# Patient Record
Sex: MALE | Race: WHITE | NOT HISPANIC OR LATINO | Employment: OTHER | RURAL
[De-identification: names, ages, dates, MRNs, and addresses within clinical notes are randomized per-mention and may not be internally consistent; named-entity substitution may affect disease eponyms.]

---

## 2022-07-14 ENCOUNTER — TELEPHONE (OUTPATIENT)
Dept: FAMILY MEDICINE CLINIC | Facility: CLINIC | Age: 77
End: 2022-07-14

## 2022-07-14 RX ORDER — DIAZEPAM 5 MG/1
TABLET ORAL
COMMUNITY
Start: 2022-06-14 | End: 2022-07-15 | Stop reason: SDUPTHER

## 2022-07-14 RX ORDER — DIAZEPAM 5 MG/1
5 TABLET ORAL EVERY 12 HOURS PRN
Qty: 60 TABLET | Refills: 0 | Status: CANCELLED | OUTPATIENT
Start: 2022-07-14

## 2022-07-14 NOTE — TELEPHONE ENCOUNTER
Caller: Omar Waite    Relationship: Self    Best call back number: [phone]     Requested Prescriptions:   Requested Prescriptions      No prescriptions requested or ordered in this encounter      DIAZEPAM 5 MG    Pharmacy where request should be sent:      [address] [phone] [facility] [phone] FX     Additional details provided by patient: PATIENT HAS NOT SEEN DOCTOR OANH SINCE JANUARY    Does the patient have less than a 3 day supply:  [] Yes  [x] No    Sofia Morrow Rep   07/14/22 11:28 EDT

## 2022-07-14 NOTE — TELEPHONE ENCOUNTER
I need Dmitri, AND PT WILL HAVE TO HAVE APPT WITH SOMEONE ASAP. Ok TO SEND IN 30 WITH NO REFILLS UNTIL HE GETS SEEN.

## 2022-07-15 DIAGNOSIS — F41.9 ANXIETY: Primary | ICD-10-CM

## 2022-07-15 RX ORDER — DIAZEPAM 5 MG/1
5 TABLET ORAL EVERY 12 HOURS PRN
Qty: 30 TABLET | Refills: 0 | Status: SHIPPED | OUTPATIENT
Start: 2022-07-15 | End: 2022-07-21 | Stop reason: SDUPTHER

## 2022-07-15 NOTE — TELEPHONE ENCOUNTER
PT CONTACTED, APPT SCHEDULED WITH MERARY GODOY 7/21/22 AS DR LUGO DID NOT HAVE ANY OPENINGS UNTIL 8/15.

## 2022-07-21 ENCOUNTER — OFFICE VISIT (OUTPATIENT)
Dept: FAMILY MEDICINE CLINIC | Facility: CLINIC | Age: 77
End: 2022-07-21

## 2022-07-21 VITALS
HEART RATE: 79 BPM | OXYGEN SATURATION: 97 % | DIASTOLIC BLOOD PRESSURE: 70 MMHG | SYSTOLIC BLOOD PRESSURE: 120 MMHG | WEIGHT: 194.5 LBS

## 2022-07-21 DIAGNOSIS — F41.9 ANXIETY: Primary | ICD-10-CM

## 2022-07-21 DIAGNOSIS — F41.9 ANXIETY: ICD-10-CM

## 2022-07-21 DIAGNOSIS — Z79.899 ENCOUNTER FOR LONG-TERM (CURRENT) USE OF OTHER MEDICATIONS: ICD-10-CM

## 2022-07-21 LAB
POC AMPHETAMINES: NEGATIVE
POC BARBITURATES: NEGATIVE
POC BENZODIAZEPHINES: POSITIVE
POC COCAINE: NEGATIVE
POC METHADONE: NEGATIVE
POC METHAMPHETAMINE SCREEN URINE: NEGATIVE
POC OPIATES: NEGATIVE
POC OXYCODONE: NEGATIVE
POC PHENCYCLIDINE: NEGATIVE
POC PROPOXYPHENE: NEGATIVE
POC THC: NEGATIVE
POC TRICYCLIC ANTIDEPRESSANTS: NEGATIVE

## 2022-07-21 PROCEDURE — 99213 OFFICE O/P EST LOW 20 MIN: CPT | Performed by: NURSE PRACTITIONER

## 2022-07-21 PROCEDURE — 36415 COLL VENOUS BLD VENIPUNCTURE: CPT | Performed by: NURSE PRACTITIONER

## 2022-07-21 PROCEDURE — 80305 DRUG TEST PRSMV DIR OPT OBS: CPT | Performed by: NURSE PRACTITIONER

## 2022-07-21 RX ORDER — TRAZODONE HYDROCHLORIDE 50 MG/1
TABLET ORAL
COMMUNITY
Start: 2022-04-26 | End: 2023-01-05

## 2022-07-21 RX ORDER — ATORVASTATIN CALCIUM 80 MG/1
80 TABLET, FILM COATED ORAL
COMMUNITY
Start: 2022-04-26

## 2022-07-21 RX ORDER — OMEPRAZOLE 40 MG/1
40 CAPSULE, DELAYED RELEASE ORAL DAILY
COMMUNITY
Start: 2022-04-26 | End: 2023-04-03

## 2022-07-21 RX ORDER — FENOFIBRATE 160 MG/1
160 TABLET ORAL DAILY
COMMUNITY
Start: 2022-04-26

## 2022-07-21 RX ORDER — DIAZEPAM 5 MG/1
5 TABLET ORAL EVERY 12 HOURS PRN
Qty: 30 TABLET | Refills: 0 | Status: SHIPPED | OUTPATIENT
Start: 2022-07-21 | End: 2022-09-27 | Stop reason: SDUPTHER

## 2022-07-21 NOTE — PROGRESS NOTES
Chief Complaint  Med Refill    Subjective          Omar Waite presents to [facility]  History of Present Illness     Patient just needs refill of his Valium.  States he has been on it for 50 years.  Takes it for anxiety.  States his son just passed away from cancer earlier in the year.  His wife has chronic health issues and he is constantly on the go with her as she has to have dialysis 3 days a week.  States he takes 1 in the morning and at night and it helps him sleep also.  States he does well with the medications with no issues or side effects from them.    Objective   Vital Signs:   /70   Pulse 79   Wt 88.2 kg (194 lb 8 oz)   SpO2 97%     There is no height or weight on file to calculate BMI.    Review of Systems   Constitutional: Negative for fatigue.   Eyes: Negative for visual disturbance.   Cardiovascular: Negative for chest pain and palpitations.   Neurological: Negative for dizziness, weakness, headache and confusion.   Psychiatric/Behavioral: Negative.  Negative for agitation.       Past History:  Medical History: has a past medical history of CAD (coronary artery disease), Chronic prostatitis, Coronary arteriosclerosis in native artery, GERD with esophagitis, Heart attack (HCC), High risk medication use, Hypertension, Insomnia due to anxiety and fear, Malaise and fatigue, Megaloblastic anemia, Mixed hyperlipidemia, Mood insomnia (HCC), Phobic disorder, Skin cancer, and Vitamin D deficiency.   Surgical History: has a past surgical history that includes Cholecystectomy.   Family History: family history includes Alzheimer's disease in his father; Coronary artery disease in his mother.   Social History: reports that he quit smoking about 20 years ago. His smoking use included cigarettes. He has never used smokeless tobacco.    PHQ-2 Depression Screening  Little interest or pleasure in doing things? 0-->not at all   Feeling down, depressed, or hopeless?  1-->several days   PHQ-2 Total Score 1        PHQ-9 Depression Screening  Little interest or pleasure in doing things? 0-->not at all   Feeling down, depressed, or hopeless? 1-->several days   Trouble falling or staying asleep, or sleeping too much?     Feeling tired or having little energy?     Poor appetite or overeating?     Feeling bad about yourself - or that you are a failure or have let yourself or your family down?     Trouble concentrating on things, such as reading the newspaper or watching television?     Moving or speaking so slowly that other people could have noticed? Or the opposite - being so fidgety or restless that you have been moving around a lot more than usual?     Thoughts that you would be better off dead, or of hurting yourself in some way?     PHQ-9 Total Score 1   If you checked off any problems, how difficult have these problems made it for you to do your work, take care of things at home, or get along with other people?       PHQ-9 Total Score: 1      Patient screened positive for depression based on a PHQ-9 score of 1 on 7/21/2022. Follow-up recommendations include: Referral to Mental Health specialist.         Current Outpatient Medications:   •  atorvastatin (LIPITOR) 80 MG tablet, Take 80 mg by mouth every night at bedtime., Disp: , Rfl:   •  diazePAM (VALIUM) 5 MG tablet, Take 1 tablet by mouth Every 12 (Twelve) Hours As Needed for Anxiety., Disp: 30 tablet, Rfl: 0  •  fenofibrate 160 MG tablet, Take 160 mg by mouth Daily., Disp: , Rfl:   •  omeprazole (priLOSEC) 40 MG capsule, Take 40 mg by mouth Daily. before a meal, Disp: , Rfl:   •  traZODone (DESYREL) 50 MG tablet, TAKE 3 TABLETS BY MOUTH ONCE DAILY AT NIGHT AT BEDTIME AS DIRECTED, Disp: , Rfl:    (Not in a hospital admission)     Allergies: Patient has no known allergies.    Physical Exam  Constitutional:       Appearance: Normal appearance.   Cardiovascular:      Rate and Rhythm: Normal rate and regular rhythm.      Heart  sounds: Normal heart sounds.   Pulmonary:      Effort: Pulmonary effort is normal.      Breath sounds: Normal breath sounds.   Neurological:      General: No focal deficit present.      Mental Status: He is alert and oriented to person, place, and time. Mental status is at baseline.   Psychiatric:         Attention and Perception: Attention and perception normal.         Mood and Affect: Mood and affect normal.         Speech: Speech normal.         Behavior: Behavior normal. Behavior is cooperative.         Thought Content: Thought content normal. Thought content does not include homicidal or suicidal ideation. Thought content does not include homicidal or suicidal plan.         Cognition and Memory: Cognition and memory normal.         Judgment: Judgment normal.          Result Review :                   Assessment and Plan    Diagnoses and all orders for this visit:    1. Anxiety (Primary)  Assessment & Plan:  UDS and Armani appropriate.  We will asked Dr. Ch to give a couple more weeks.  Will refer to psychiatry for further evaluation and to determine if they want to continue him with the Valium.  Controlled substance contract on file.  Risk of meds discussed and understood.  Education provided.  Return to clinic or ED with any issues or concerns.    Orders:  -     Ambulatory Referral to Psychiatry    2. Encounter for long-term (current) use of other medications  -     Comprehensive Metabolic Panel; Future  -     POC Urine Drug Screen, Triage; Future  -     Comprehensive Metabolic Panel  -     POC Urine Drug Screen, Triage                Follow Up   Return in about 3 months (around 10/21/2022).  Patient was given instructions and counseling regarding his condition or for health maintenance advice. Please see specific information pulled into the AVS if appropriate.     FAUSTINO Wolf

## 2022-07-21 NOTE — TELEPHONE ENCOUNTER
Coco Ch, this is the patient I spoke with you about earlier. I have referred him out to psychiatry but would you mind to send one more refill of the valium to last him until he gets in with them. ROSALINA and SANTOSS appropriate. You sent a refill for 15 days on 7/15/22 so I have put on the prescription that it can be filled after 7/30/22. Have set for quantity 30 (takes it BID). Thanks

## 2022-07-21 NOTE — ASSESSMENT & PLAN NOTE
KALEIGH and Armani appropriate.  We will asked Dr. Ch to give a couple more weeks.  Will refer to psychiatry for further evaluation and to determine if they want to continue him with the Valium.  Controlled substance contract on file.  Risk of meds discussed and understood.  Education provided.  Return to clinic or ED with any issues or concerns.

## 2022-07-22 LAB
ALBUMIN SERPL-MCNC: 4.6 G/DL (ref 3.7–4.7)
ALBUMIN/GLOB SERPL: 1.6 {RATIO} (ref 1.2–2.2)
ALP SERPL-CCNC: 63 IU/L (ref 44–121)
ALT SERPL-CCNC: 25 IU/L (ref 0–44)
AST SERPL-CCNC: 23 IU/L (ref 0–40)
BILIRUB SERPL-MCNC: 0.4 MG/DL (ref 0–1.2)
BUN SERPL-MCNC: 18 MG/DL (ref 8–27)
BUN/CREAT SERPL: 14 (ref 10–24)
CALCIUM SERPL-MCNC: 9.7 MG/DL (ref 8.6–10.2)
CHLORIDE SERPL-SCNC: 106 MMOL/L (ref 96–106)
CO2 SERPL-SCNC: 21 MMOL/L (ref 20–29)
CREAT SERPL-MCNC: 1.29 MG/DL (ref 0.76–1.27)
EGFRCR SERPLBLD CKD-EPI 2021: 57 ML/MIN/1.73
GLOBULIN SER CALC-MCNC: 2.8 G/DL (ref 1.5–4.5)
GLUCOSE SERPL-MCNC: 106 MG/DL (ref 65–99)
POTASSIUM SERPL-SCNC: 4.2 MMOL/L (ref 3.5–5.2)
PROT SERPL-MCNC: 7.4 G/DL (ref 6–8.5)
SODIUM SERPL-SCNC: 139 MMOL/L (ref 134–144)

## 2022-08-01 DIAGNOSIS — F41.9 ANXIETY: ICD-10-CM

## 2022-08-01 RX ORDER — DIAZEPAM 5 MG/1
5 TABLET ORAL EVERY 12 HOURS PRN
Qty: 30 TABLET | Refills: 0 | OUTPATIENT
Start: 2022-08-01

## 2022-08-01 NOTE — TELEPHONE ENCOUNTER
Caller: Omar Waite    Relationship: Self    Best call back number: [phone]    Requested Prescriptions:   Requested Prescriptions     Pending Prescriptions Disp Refills   • diazePAM (VALIUM) 5 MG tablet 30 tablet 0     Sig: Take 1 tablet by mouth Every 12 (Twelve) Hours As Needed for Anxiety.        Pharmacy where request should be sent: [facility] [address] [place] [address] [phone] [facility] [phone] FX     Additional details provided by patient:   PATIENT ONLY HAS 3 PILLS LEFT OF MEDICATION     Does the patient have less than a 3 day supply:  [x] Yes  [] No    Sofia Dexter Rep   08/01/22 09:25 EDT

## 2022-08-27 ENCOUNTER — OFFICE VISIT (OUTPATIENT)
Dept: FAMILY MEDICINE CLINIC | Facility: CLINIC | Age: 77
End: 2022-08-27

## 2022-08-27 VITALS
SYSTOLIC BLOOD PRESSURE: 158 MMHG | HEIGHT: 72 IN | DIASTOLIC BLOOD PRESSURE: 98 MMHG | TEMPERATURE: 97.5 F | WEIGHT: 188 LBS | BODY MASS INDEX: 25.47 KG/M2 | HEART RATE: 93 BPM | OXYGEN SATURATION: 98 %

## 2022-08-27 DIAGNOSIS — Z20.822 CONTACT WITH AND (SUSPECTED) EXPOSURE TO COVID-19: Primary | ICD-10-CM

## 2022-08-27 LAB
EXPIRATION DATE: NORMAL
INTERNAL CONTROL: NORMAL
Lab: NORMAL
SARS-COV-2 AG UPPER RESP QL IA.RAPID: NOT DETECTED

## 2022-08-27 PROCEDURE — 87426 SARSCOV CORONAVIRUS AG IA: CPT | Performed by: PHYSICIAN ASSISTANT

## 2022-08-27 PROCEDURE — 99213 OFFICE O/P EST LOW 20 MIN: CPT | Performed by: PHYSICIAN ASSISTANT

## 2022-08-27 RX ORDER — BETAMETHASONE DIPROPIONATE 0.5 MG/G
CREAM TOPICAL
COMMUNITY
Start: 2022-08-15 | End: 2022-09-27

## 2022-08-27 NOTE — PROGRESS NOTES
"Chief Complaint  Sinusitis    Subjective          Omar Waite presents to [facility]  Patient in today for evaluation on runny nose for the last 2 days.  Started after he was mowing, but was concerned and wanted to get checked for COVID as may have had exposure to family member but states had been a while since around them.   Denies any fever.  No nausea, vomiting, or diarrhea.  Has had minimal cough on occasion.  He does monitor his blood pressure and generally runs in the 130s over 80s.  Has any chest pain or shortness of breath.    URI   This is a new problem. Associated symptoms include coughing. Pertinent negatives include no chest pain, congestion, diarrhea, dysuria, nausea, sinus pain, sore throat or vomiting.       Objective   Vital Signs:   /98   Pulse 93   Temp 97.5 °F (36.4 °C)   Ht 182.9 cm (72\")   Wt 85.3 kg (188 lb)   SpO2 98%   BMI 25.50 kg/m²     Body mass index is 25.5 kg/m².    Review of Systems   Constitutional: Negative for fever.   HENT: Negative for congestion and sore throat.    Respiratory: Positive for cough.    Cardiovascular: Negative for chest pain.   Gastrointestinal: Negative for diarrhea, nausea and vomiting.   Genitourinary: Negative for dysuria and frequency.   Neurological: Negative for dizziness and headache.       Past History:  Medical History: has a past medical history of CAD (coronary artery disease), Chronic prostatitis, Coronary arteriosclerosis in native artery, GERD with esophagitis, Heart attack (HCC), High risk medication use, Hypertension, Insomnia due to anxiety and fear, Malaise and fatigue, Megaloblastic anemia, Mixed hyperlipidemia, Mood insomnia (HCC), Phobic disorder, Skin cancer, and Vitamin D deficiency.   Surgical History: has a past surgical history that includes Cholecystectomy.   Family History: family history includes Alzheimer's disease in his father; Coronary artery disease in his mother.   Social History: " reports that he quit smoking about 20 years ago. His smoking use included cigarettes. He has never used smokeless tobacco.      Current Outpatient Medications:   •  atorvastatin (LIPITOR) 80 MG tablet, Take 80 mg by mouth every night at bedtime., Disp: , Rfl:   •  betamethasone, augmented, (DIPROLENE) 0.05 % cream, APPLY TOPICALLY TO HANDS TWICE DAILY FOR 2 WEEKS, Disp: , Rfl:   •  diazePAM (VALIUM) 5 MG tablet, Take 1 tablet by mouth Every 12 (Twelve) Hours As Needed for Anxiety., Disp: 30 tablet, Rfl: 0  •  fenofibrate 160 MG tablet, Take 160 mg by mouth Daily., Disp: , Rfl:   •  omeprazole (priLOSEC) 40 MG capsule, Take 40 mg by mouth Daily. before a meal, Disp: , Rfl:   •  traZODone (DESYREL) 50 MG tablet, TAKE 3 TABLETS BY MOUTH ONCE DAILY AT NIGHT AT BEDTIME AS DIRECTED, Disp: , Rfl:   Allergies: Patient has no known allergies.    Physical Exam        Assessment and Plan   Diagnoses and all orders for this visit:    1. Contact with and (suspected) exposure to covid-19 (Primary)  -     POCT SARS-CoV-2 Antigen DANIELLE    Rapid COVID testing was negative.  Recommend symptom management at this time.  Return to clinic or to ER for any progression of symptoms if needed.  Encourage patient to monitor his blood pressure as was up a bit in the office today and he will let us know if remains elevated.      Follow Up   No follow-ups on file.  Patient was given instructions and counseling regarding his condition or for health maintenance advice. Please see specific information pulled into the AVS if appropriate.     Iris Ingram PA-C

## 2022-09-27 ENCOUNTER — OFFICE VISIT (OUTPATIENT)
Dept: FAMILY MEDICINE CLINIC | Facility: CLINIC | Age: 77
End: 2022-09-27

## 2022-09-27 VITALS
SYSTOLIC BLOOD PRESSURE: 140 MMHG | BODY MASS INDEX: 26.01 KG/M2 | HEART RATE: 59 BPM | DIASTOLIC BLOOD PRESSURE: 82 MMHG | OXYGEN SATURATION: 98 % | HEIGHT: 72 IN | WEIGHT: 192 LBS

## 2022-09-27 DIAGNOSIS — N40.1 BENIGN PROSTATIC HYPERPLASIA WITH LOWER URINARY TRACT SYMPTOMS, SYMPTOM DETAILS UNSPECIFIED: ICD-10-CM

## 2022-09-27 DIAGNOSIS — E78.2 MIXED HYPERLIPIDEMIA: ICD-10-CM

## 2022-09-27 DIAGNOSIS — F41.9 ANXIETY: ICD-10-CM

## 2022-09-27 DIAGNOSIS — F51.01 PRIMARY INSOMNIA: ICD-10-CM

## 2022-09-27 DIAGNOSIS — K21.9 GASTROESOPHAGEAL REFLUX DISEASE WITHOUT ESOPHAGITIS: ICD-10-CM

## 2022-09-27 DIAGNOSIS — Z00.00 ENCOUNTER FOR ANNUAL WELLNESS EXAM IN MEDICARE PATIENT: Primary | ICD-10-CM

## 2022-09-27 PROCEDURE — G0439 PPPS, SUBSEQ VISIT: HCPCS | Performed by: NURSE PRACTITIONER

## 2022-09-27 PROCEDURE — 99213 OFFICE O/P EST LOW 20 MIN: CPT | Performed by: NURSE PRACTITIONER

## 2022-09-27 PROCEDURE — 1159F MED LIST DOCD IN RCRD: CPT | Performed by: NURSE PRACTITIONER

## 2022-09-27 PROCEDURE — 1170F FXNL STATUS ASSESSED: CPT | Performed by: NURSE PRACTITIONER

## 2022-09-27 RX ORDER — TAMSULOSIN HYDROCHLORIDE 0.4 MG/1
CAPSULE ORAL
COMMUNITY
End: 2022-12-19

## 2022-09-27 RX ORDER — DIAZEPAM 5 MG/1
5 TABLET ORAL EVERY 12 HOURS PRN
Qty: 60 TABLET | Refills: 2 | Status: SHIPPED | OUTPATIENT
Start: 2022-09-27 | End: 2023-03-13 | Stop reason: SDUPTHER

## 2022-09-27 NOTE — PROGRESS NOTES
The ABCs of the Annual Wellness Visit  Subsequent Medicare Wellness Visit    Chief Complaint   Patient presents with   • Anxiety   • Annual Exam      Subjective    History of Present Illness:  Omar Waite is a 77 y.o. male who presents for a Subsequent Medicare Wellness Visit.    The following portions of the patient's history were reviewed and   updated as appropriate: past family history, past social history and past surgical history.    Compared to one year ago, the patient feels his physical   health is the same.    Compared to one year ago, the patient feels his mental   health is the same.    Recent Hospitalizations:  He was not admitted to the hospital during the last year.       Current Medical Providers:  Patient Care Team:  Kia Willard APRN as PCP - General (Family Medicine)    Outpatient Medications Prior to Visit   Medication Sig Dispense Refill   • ASPIRIN 81 PO 81 mg.     • atorvastatin (LIPITOR) 80 MG tablet Take 80 mg by mouth every night at bedtime.     • fenofibrate 160 MG tablet Take 160 mg by mouth Daily.     • omeprazole (priLOSEC) 40 MG capsule Take 40 mg by mouth Daily. before a meal     • tamsulosin (FLOMAX) 0.4 MG capsule 24 hr capsule tamsulosin 0.4 mg capsule   Take 1 capsule every day by oral route.     • traZODone (DESYREL) 50 MG tablet TAKE 3 TABLETS BY MOUTH ONCE DAILY AT NIGHT AT BEDTIME AS DIRECTED     • diazePAM (VALIUM) 5 MG tablet Take 1 tablet by mouth Every 12 (Twelve) Hours As Needed for Anxiety. 30 tablet 0   • betamethasone, augmented, (DIPROLENE) 0.05 % cream APPLY TOPICALLY TO HANDS TWICE DAILY FOR 2 WEEKS     • Omeprazole Magnesium (PRILOSEC PO) omeprazole       No facility-administered medications prior to visit.       No opioid medication identified on active medication list. I have reviewed chart for other potential  high risk medication/s and harmful drug interactions in the elderly.          Aspirin is on active medication list. Aspirin use is indicated  "based on review of current medical condition/s. Pros and cons of this therapy have been discussed today. Benefits of this medication outweigh potential harm.  Patient has been encouraged to continue taking this medication.  .      Patient Active Problem List   Diagnosis   • Anxiety   • Encounter for long-term (current) use of other medications   • Mixed hyperlipidemia   • Benign prostatic hyperplasia with lower urinary tract symptoms   • Primary insomnia     Advance Care Planning  Advance Directive is not on file.  ACP discussion was held with the patient during this visit. Patient does not have an advance directive, declines further assistance.    Review of Systems   Constitutional: Negative for fatigue and fever.   Respiratory: Negative for shortness of breath.    Cardiovascular: Negative for chest pain, palpitations and leg swelling.   Neurological: Negative for syncope.   Psychiatric/Behavioral: The patient is nervous/anxious.         Objective    Vitals:    09/27/22 1054   BP: 140/82   Pulse: 59   SpO2: 98%   Weight: 87.1 kg (192 lb)   Height: 182.9 cm (72\")     Estimated body mass index is 26.04 kg/m² as calculated from the following:    Height as of this encounter: 182.9 cm (72\").    Weight as of this encounter: 87.1 kg (192 lb).    BMI is >= 25 and <30. (Overweight) The following options were offered after discussion;: nutrition counseling/recommendations      Does the patient have evidence of cognitive impairment? No    Physical Exam  Constitutional:       Appearance: Normal appearance.   HENT:      Head: Normocephalic.   Eyes:      Conjunctiva/sclera: Conjunctivae normal.      Pupils: Pupils are equal, round, and reactive to light.   Cardiovascular:      Rate and Rhythm: Normal rate and regular rhythm.      Heart sounds: Normal heart sounds.   Pulmonary:      Effort: Pulmonary effort is normal.      Breath sounds: Normal breath sounds.   Abdominal:      Tenderness: There is no abdominal tenderness. "   Musculoskeletal:         General: Normal range of motion.   Skin:     General: Skin is warm and dry.      Capillary Refill: Capillary refill takes less than 2 seconds.   Neurological:      General: No focal deficit present.      Mental Status: He is alert and oriented to person, place, and time.   Psychiatric:         Mood and Affect: Mood normal.         Behavior: Behavior normal.         Thought Content: Thought content normal.         Judgment: Judgment normal.                 HEALTH RISK ASSESSMENT    Smoking Status:  Social History     Tobacco Use   Smoking Status Former Smoker   • Packs/day: 3.00   • Years: 46.00   • Pack years: 138.00   • Types: Cigarettes   • Start date:    • Quit date:    • Years since quittin.7   Smokeless Tobacco Never Used     Alcohol Consumption:  Social History     Substance and Sexual Activity   Alcohol Use None     Fall Risk Screen:    AMARIADI Fall Risk Assessment was completed, and patient is at LOW risk for falls.Assessment completed on:2022    Depression Screening:  PHQ-2/PHQ-9 Depression Screening 2022   Little Interest or Pleasure in Doing Things 0-->not at all   Feeling Down, Depressed or Hopeless 1-->several days   PHQ-9: Brief Depression Severity Measure Score 1       Health Habits and Functional and Cognitive Screening:  Functional & Cognitive Status 2022   Do you have difficulty preparing food and eating? No   Do you have difficulty bathing yourself, getting dressed or grooming yourself? No   Do you have difficulty using the toilet? No   Do you have difficulty moving around from place to place? No   Do you have trouble with steps or getting out of a bed or a chair? No   Current Diet Well Balanced Diet   Dental Exam Not up to date   Eye Exam Up to date   Exercise (times per week) 2 times per week   Current Exercises Include Walking;Yard Work   Do you need help using the phone?  No   Are you deaf or do you have serious difficulty hearing?  No   Do  you need help with transportation? No   Do you need help shopping? No   Do you need help preparing meals?  No   Do you need help with housework?  No   Do you need help with laundry? No   Do you need help taking your medications? No   Do you need help managing money? No   Do you ever drive or ride in a car without wearing a seat belt? No   Have you felt unusual stress, anger or loneliness in the last month? No   Who do you live with? Spouse   If you need help, do you have trouble finding someone available to you? No   Have you been bothered in the last four weeks by sexual problems? No   Do you have difficulty concentrating, remembering or making decisions? Yes       Age-appropriate Screening Schedule:  Refer to the list below for future screening recommendations based on patient's age, sex and/or medical conditions. Orders for these recommended tests are listed in the plan section. The patient has been provided with a written plan.    Health Maintenance   Topic Date Due   • ZOSTER VACCINE (2 of 3) 09/27/2023 (Originally 2/26/2016)   • INFLUENZA VACCINE  10/01/2022   • LIPID PANEL  01/03/2023   • TDAP/TD VACCINES (3 - Td or Tdap) 04/19/2025              Assessment & Plan   CMS Preventative Services Quick Reference  Risk Factors Identified During Encounter  None Identified  The above risks/problems have been discussed with the patient.  Follow up actions/plans if indicated are seen below in the Assessment/Plan Section.  Pertinent information has been shared with the patient in the After Visit Summary.    Diagnoses and all orders for this visit:    1. Encounter for annual wellness exam in Medicare patient (Primary)  Comments:  We discussed diet, exercise, safety, vaccines, and prev counseling.     2. Anxiety  Comments:  Restart Valium to use PRN. RTC for worsened sx and in 3 months for follow up.   Assessment & Plan:  Medication appropriateness discussed. Alternatives considered. Compliance discussed. Adverse effects  and interactions assessed.     Orders:  -     diazePAM (VALIUM) 5 MG tablet; Take 1 tablet by mouth Every 12 (Twelve) Hours As Needed for Anxiety.  Dispense: 60 tablet; Refill: 2    3. Mixed hyperlipidemia  Comments:  Continue current meds. We discussed diet and exercise.   Assessment & Plan:  Medication appropriateness discussed. Alternatives considered. Compliance discussed. Adverse effects and interactions assessed.         4. Gastroesophageal reflux disease without esophagitis  Comments:  Continue current meds.     5. Benign prostatic hyperplasia with lower urinary tract symptoms, symptom details unspecified  Comments:  Continue current meds.  Assessment & Plan:  Medication appropriateness discussed. Alternatives considered. Compliance discussed. Adverse effects and interactions assessed.         6. Primary insomnia  Comments:  Continue current meds.   Assessment & Plan:  Medication appropriateness discussed. Alternatives considered. Compliance discussed. Adverse effects and interactions assessed.           Follow Up:   No follow-ups on file.     An After Visit Summary and PPPS were made available to the patient.

## 2022-09-27 NOTE — ASSESSMENT & PLAN NOTE
Medication appropriateness discussed. Alternatives considered. Compliance discussed. Adverse effects and interactions assessed.

## 2022-09-27 NOTE — PROGRESS NOTES
"Chief Complaint  Anxiety and Annual Exam    Subjective          Omar Waite presents to [facility]  History of Present Illness  Pt has had increased anxiety since being off of his Valium. He took this in the past twice daily for anxiety. He states his other medications are working well.       Objective   Vital Signs:   /82   Pulse 59   Ht 182.9 cm (72\")   Wt 87.1 kg (192 lb)   SpO2 98%   BMI 26.04 kg/m²     Body mass index is 26.04 kg/m².    Review of Systems   Constitutional: Negative for fatigue and fever.   Respiratory: Negative for shortness of breath.    Cardiovascular: Negative for chest pain, palpitations and leg swelling.   Neurological: Negative for syncope.   Psychiatric/Behavioral: The patient is not nervous/anxious.           Current Outpatient Medications:   •  ASPIRIN 81 PO, 81 mg., Disp: , Rfl:   •  atorvastatin (LIPITOR) 80 MG tablet, Take 80 mg by mouth every night at bedtime., Disp: , Rfl:   •  diazePAM (VALIUM) 5 MG tablet, Take 1 tablet by mouth Every 12 (Twelve) Hours As Needed for Anxiety., Disp: 60 tablet, Rfl: 2  •  fenofibrate 160 MG tablet, Take 160 mg by mouth Daily., Disp: , Rfl:   •  omeprazole (priLOSEC) 40 MG capsule, Take 40 mg by mouth Daily. before a meal, Disp: , Rfl:   •  tamsulosin (FLOMAX) 0.4 MG capsule 24 hr capsule, tamsulosin 0.4 mg capsule  Take 1 capsule every day by oral route., Disp: , Rfl:   •  traZODone (DESYREL) 50 MG tablet, TAKE 3 TABLETS BY MOUTH ONCE DAILY AT NIGHT AT BEDTIME AS DIRECTED, Disp: , Rfl:       Allergies: Patient has no known allergies.    Physical Exam  Constitutional:       Appearance: Normal appearance.   HENT:      Head: Normocephalic.   Eyes:      Conjunctiva/sclera: Conjunctivae normal.      Pupils: Pupils are equal, round, and reactive to light.   Cardiovascular:      Rate and Rhythm: Normal rate and regular rhythm.      Heart sounds: Normal heart sounds.   Pulmonary:      Effort: Pulmonary " effort is normal.      Breath sounds: Normal breath sounds.   Abdominal:      Tenderness: There is no abdominal tenderness.   Musculoskeletal:         General: Normal range of motion.   Skin:     General: Skin is warm and dry.      Capillary Refill: Capillary refill takes less than 2 seconds.   Neurological:      General: No focal deficit present.      Mental Status: He is alert and oriented to person, place, and time.   Psychiatric:         Mood and Affect: Mood normal.         Behavior: Behavior normal.         Thought Content: Thought content normal.         Judgment: Judgment normal.          Result Review :                   Assessment and Plan    Diagnoses and all orders for this visit:    1. Encounter for annual wellness exam in Medicare patient (Primary)  Comments:  We discussed diet, exercise, safety, vaccines, and prev counseling.     2. Anxiety  Comments:  Restart Valium to use PRN. RTC for worsened sx and in 3 months for follow up.   Assessment & Plan:  Medication appropriateness discussed. Alternatives considered. Compliance discussed. Adverse effects and interactions assessed.     Orders:  -     diazePAM (VALIUM) 5 MG tablet; Take 1 tablet by mouth Every 12 (Twelve) Hours As Needed for Anxiety.  Dispense: 60 tablet; Refill: 2    3. Mixed hyperlipidemia  Comments:  Continue current meds. We discussed diet and exercise.   Assessment & Plan:  Medication appropriateness discussed. Alternatives considered. Compliance discussed. Adverse effects and interactions assessed.         4. Gastroesophageal reflux disease without esophagitis  Comments:  Continue current meds.     5. Benign prostatic hyperplasia with lower urinary tract symptoms, symptom details unspecified  Comments:  Continue current meds.  Assessment & Plan:  Medication appropriateness discussed. Alternatives considered. Compliance discussed. Adverse effects and interactions assessed.         6. Primary insomnia  Comments:  Continue current meds.    Assessment & Plan:  Medication appropriateness discussed. Alternatives considered. Compliance discussed. Adverse effects and interactions assessed.                   Follow Up   No follow-ups on file.  Patient was given instructions and counseling regarding his condition or for health maintenance advice. Please see specific information pulled into the AVS if appropriate.     FAUSTINO Hamilton

## 2022-10-27 ENCOUNTER — FLU SHOT (OUTPATIENT)
Dept: FAMILY MEDICINE CLINIC | Facility: CLINIC | Age: 77
End: 2022-10-27

## 2022-10-27 PROCEDURE — G0008 ADMIN INFLUENZA VIRUS VAC: HCPCS | Performed by: PHYSICIAN ASSISTANT

## 2022-10-27 PROCEDURE — 90662 IIV NO PRSV INCREASED AG IM: CPT | Performed by: PHYSICIAN ASSISTANT

## 2022-11-21 ENCOUNTER — OFFICE VISIT (OUTPATIENT)
Dept: FAMILY MEDICINE CLINIC | Facility: CLINIC | Age: 77
End: 2022-11-21

## 2022-11-21 VITALS
DIASTOLIC BLOOD PRESSURE: 82 MMHG | WEIGHT: 194 LBS | HEIGHT: 72 IN | BODY MASS INDEX: 26.28 KG/M2 | TEMPERATURE: 97.7 F | SYSTOLIC BLOOD PRESSURE: 150 MMHG | HEART RATE: 79 BPM | OXYGEN SATURATION: 100 %

## 2022-11-21 DIAGNOSIS — J02.9 SORE THROAT: ICD-10-CM

## 2022-11-21 DIAGNOSIS — R05.9 COUGH, UNSPECIFIED TYPE: Primary | ICD-10-CM

## 2022-11-21 LAB
EXPIRATION DATE: NORMAL
EXPIRATION DATE: NORMAL
FLUAV AG UPPER RESP QL IA.RAPID: NOT DETECTED
FLUBV AG UPPER RESP QL IA.RAPID: NOT DETECTED
INTERNAL CONTROL: NORMAL
INTERNAL CONTROL: NORMAL
Lab: NORMAL
Lab: NORMAL
S PYO AG THROAT QL: NEGATIVE
SARS-COV-2 RNA RESP QL NAA+PROBE: NOT DETECTED

## 2022-11-21 PROCEDURE — 87428 SARSCOV & INF VIR A&B AG IA: CPT | Performed by: STUDENT IN AN ORGANIZED HEALTH CARE EDUCATION/TRAINING PROGRAM

## 2022-11-21 PROCEDURE — 99213 OFFICE O/P EST LOW 20 MIN: CPT | Performed by: STUDENT IN AN ORGANIZED HEALTH CARE EDUCATION/TRAINING PROGRAM

## 2022-11-21 PROCEDURE — 87880 STREP A ASSAY W/OPTIC: CPT | Performed by: STUDENT IN AN ORGANIZED HEALTH CARE EDUCATION/TRAINING PROGRAM

## 2022-11-21 RX ORDER — GUAIFENESIN AND CODEINE PHOSPHATE 100; 10 MG/5ML; MG/5ML
5 SOLUTION ORAL 3 TIMES DAILY PRN
Qty: 120 ML | Refills: 0 | Status: SHIPPED | OUTPATIENT
Start: 2022-11-21 | End: 2023-02-21

## 2022-11-21 NOTE — PROGRESS NOTES
"Chief Complaint  Cough (Patient states he has a cough and fatigue. )    Subjective          Omar Waite presents to [facility]  Cough  This is a new problem. The current episode started in the past 7 days (3 days). The problem has been gradually worsening. The cough is non-productive. Associated symptoms include ear congestion, headaches, nasal congestion, postnasal drip and rhinorrhea. Pertinent negatives include no fever or shortness of breath. Nothing aggravates the symptoms. He has tried nothing for the symptoms. The treatment provided no relief.       Objective   Vital Signs:   /82   Pulse 79   Temp 97.7 °F (36.5 °C) (Oral)   Ht 182.9 cm (72\")   Wt 88 kg (194 lb)   SpO2 100%   BMI 26.31 kg/m²     Body mass index is 26.31 kg/m².    Review of Systems   Constitutional: Negative for fever.   HENT: Positive for postnasal drip and rhinorrhea.    Respiratory: Positive for cough. Negative for shortness of breath.        Past History:  Medical History: has a past medical history of CAD (coronary artery disease), Chronic prostatitis, Coronary arteriosclerosis in native artery, GERD with esophagitis, Heart attack (HCC), High risk medication use, Hypertension, Insomnia due to anxiety and fear, Malaise and fatigue, Megaloblastic anemia, Mixed hyperlipidemia, Mood insomnia (HCC), Phobic disorder, Skin cancer, and Vitamin D deficiency.   Surgical History: has a past surgical history that includes Cholecystectomy.   Family History: family history includes Alzheimer's disease in his father; Coronary artery disease in his mother.   Social History: reports that he quit smoking about 20 years ago. His smoking use included cigarettes. He started smoking about 66 years ago. He has a 138.00 pack-year smoking history. He has never used smokeless tobacco.      Current Outpatient Medications:   •  ASPIRIN 81 PO, 81 mg., Disp: , Rfl:   •  atorvastatin (LIPITOR) 80 MG tablet, Take 80 mg by " mouth every night at bedtime., Disp: , Rfl:   •  diazePAM (VALIUM) 5 MG tablet, Take 1 tablet by mouth Every 12 (Twelve) Hours As Needed for Anxiety., Disp: 60 tablet, Rfl: 2  •  fenofibrate 160 MG tablet, Take 160 mg by mouth Daily., Disp: , Rfl:   •  omeprazole (priLOSEC) 40 MG capsule, Take 40 mg by mouth Daily. before a meal, Disp: , Rfl:   •  tamsulosin (FLOMAX) 0.4 MG capsule 24 hr capsule, tamsulosin 0.4 mg capsule  Take 1 capsule every day by oral route., Disp: , Rfl:   •  traZODone (DESYREL) 50 MG tablet, TAKE 3 TABLETS BY MOUTH ONCE DAILY AT NIGHT AT BEDTIME AS DIRECTED, Disp: , Rfl:   •  guaiFENesin-codeine (GUAIFENESIN AC) 100-10 MG/5ML liquid, Take 5 mL by mouth 3 (Three) Times a Day As Needed for Cough., Disp: 120 mL, Rfl: 0    Allergies: Patient has no known allergies.    Physical Exam  Constitutional:       General: He is not in acute distress.     Appearance: He is not ill-appearing or toxic-appearing.   HENT:      Head: Normocephalic and atraumatic.      Right Ear: Ear canal and external ear normal.      Left Ear: Ear canal and external ear normal.      Nose: Congestion and rhinorrhea present.      Mouth/Throat:      Pharynx: Posterior oropharyngeal erythema (cobblestoning) present.   Eyes:      General: No scleral icterus.  Cardiovascular:      Rate and Rhythm: Normal rate and regular rhythm.   Pulmonary:      Effort: Pulmonary effort is normal.      Breath sounds: Normal breath sounds.   Neurological:      Mental Status: He is alert.          Result Review :                   Assessment and Plan    Diagnoses and all orders for this visit:    1. Cough, unspecified type (Primary)  -     Covid-19 + Flu A&B AG, Veritor  -     guaiFENesin-codeine (GUAIFENESIN AC) 100-10 MG/5ML liquid; Take 5 mL by mouth 3 (Three) Times a Day As Needed for Cough.  Dispense: 120 mL; Refill: 0    2. Sore throat  -     POC Rapid Strep A    COVID, Flu and strep negative. Will send in [facility] for cough and congestion.  Tylenol/Motrin for pain/fever. OTC cough and cold medication for symptoms. Nasal saline spray recommended. Cool mist humidifier at the bedside. RTC with new or worsening symptoms.      Follow Up   No follow-ups on file.  Patient was given instructions and counseling regarding his condition or for health maintenance advice. Please see specific information pulled into the AVS if appropriate.     Glendy Denney, DO

## 2022-12-15 ENCOUNTER — TELEPHONE (OUTPATIENT)
Dept: FAMILY MEDICINE CLINIC | Facility: CLINIC | Age: 77
End: 2022-12-15

## 2022-12-15 NOTE — TELEPHONE ENCOUNTER
PATIENT AT CHECK OUT ASKED TO BE SCHEDULED WITH DR HUGO FOR A PROSTATE MASSAGE LET KNOW HE DOES NOT HAVE ANYTHING UNTIL 1/6/23 HE SAID THAT IS TOO LONG TO WAIT AND ASKED IF HE COULD BE WORKED IN SOONER PLEASE CALL

## 2022-12-16 NOTE — TELEPHONE ENCOUNTER
PT CAME BACK IN OFFICE TODAY ASKING ABOUT PREVIOUS MESSAGE, SPOKE TO DR HUGO, SCHEDULED PT FOR MON PER DR HUGO.

## 2022-12-19 ENCOUNTER — OFFICE VISIT (OUTPATIENT)
Dept: FAMILY MEDICINE CLINIC | Facility: CLINIC | Age: 77
End: 2022-12-19

## 2022-12-19 VITALS
SYSTOLIC BLOOD PRESSURE: 130 MMHG | BODY MASS INDEX: 26.01 KG/M2 | DIASTOLIC BLOOD PRESSURE: 84 MMHG | OXYGEN SATURATION: 98 % | HEIGHT: 72 IN | WEIGHT: 192 LBS | HEART RATE: 59 BPM

## 2022-12-19 DIAGNOSIS — N40.1 BENIGN PROSTATIC HYPERPLASIA WITH LOWER URINARY TRACT SYMPTOMS, SYMPTOM DETAILS UNSPECIFIED: Primary | ICD-10-CM

## 2022-12-19 DIAGNOSIS — F41.9 ANXIETY: ICD-10-CM

## 2022-12-19 DIAGNOSIS — E78.2 MIXED HYPERLIPIDEMIA: ICD-10-CM

## 2022-12-19 DIAGNOSIS — F51.01 PRIMARY INSOMNIA: ICD-10-CM

## 2022-12-19 PROCEDURE — 99214 OFFICE O/P EST MOD 30 MIN: CPT | Performed by: FAMILY MEDICINE

## 2022-12-19 RX ORDER — ESCITALOPRAM OXALATE 5 MG/1
5 TABLET ORAL DAILY
Qty: 30 TABLET | Refills: 5 | Status: SHIPPED | OUTPATIENT
Start: 2022-12-19 | End: 2023-01-10
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
## 2023-01-04 DIAGNOSIS — E55.9 VITAMIN D DEFICIENCY: ICD-10-CM

## 2023-01-04 DIAGNOSIS — R53.83 OTHER FATIGUE: ICD-10-CM

## 2023-01-04 DIAGNOSIS — Z12.5 SCREENING PSA (PROSTATE SPECIFIC ANTIGEN): ICD-10-CM

## 2023-01-04 DIAGNOSIS — Z79.899 HIGH RISK MEDICATION USE: ICD-10-CM

## 2023-01-04 DIAGNOSIS — D53.1 MEGALOBLASTIC ANEMIA: ICD-10-CM

## 2023-01-04 DIAGNOSIS — E78.2 MIXED HYPERLIPIDEMIA: Primary | ICD-10-CM

## 2023-01-05 ENCOUNTER — LAB (OUTPATIENT)
Dept: FAMILY MEDICINE CLINIC | Facility: CLINIC | Age: 78
End: 2023-01-05
Payer: MEDICARE

## 2023-01-05 DIAGNOSIS — Z79.899 HIGH RISK MEDICATION USE: ICD-10-CM

## 2023-01-05 DIAGNOSIS — E78.2 MIXED HYPERLIPIDEMIA: ICD-10-CM

## 2023-01-05 DIAGNOSIS — E55.9 VITAMIN D DEFICIENCY: ICD-10-CM

## 2023-01-05 DIAGNOSIS — R53.83 OTHER FATIGUE: ICD-10-CM

## 2023-01-05 DIAGNOSIS — Z12.5 SCREENING PSA (PROSTATE SPECIFIC ANTIGEN): ICD-10-CM

## 2023-01-05 DIAGNOSIS — D53.1 MEGALOBLASTIC ANEMIA: ICD-10-CM

## 2023-01-05 PROCEDURE — 36415 COLL VENOUS BLD VENIPUNCTURE: CPT | Performed by: FAMILY MEDICINE

## 2023-01-05 RX ORDER — TRAZODONE HYDROCHLORIDE 50 MG/1
TABLET ORAL
Qty: 270 TABLET | Refills: 0 | Status: SHIPPED | OUTPATIENT
Start: 2023-01-05

## 2023-01-06 LAB
25(OH)D3+25(OH)D2 SERPL-MCNC: 30.4 NG/ML (ref 30–100)
ALBUMIN SERPL-MCNC: 4.7 G/DL (ref 3.7–4.7)
ALBUMIN/GLOB SERPL: 1.8 {RATIO} (ref 1.2–2.2)
ALP SERPL-CCNC: 60 IU/L (ref 44–121)
ALT SERPL-CCNC: 25 IU/L (ref 0–44)
AST SERPL-CCNC: 27 IU/L (ref 0–40)
BASOPHILS # BLD AUTO: 0.1 X10E3/UL (ref 0–0.2)
BASOPHILS NFR BLD AUTO: 1 %
BILIRUB SERPL-MCNC: 0.5 MG/DL (ref 0–1.2)
BUN SERPL-MCNC: 21 MG/DL (ref 8–27)
BUN/CREAT SERPL: 16 (ref 10–24)
CALCIUM SERPL-MCNC: 10.2 MG/DL (ref 8.6–10.2)
CHLORIDE SERPL-SCNC: 104 MMOL/L (ref 96–106)
CHOLEST SERPL-MCNC: 143 MG/DL (ref 100–199)
CO2 SERPL-SCNC: 23 MMOL/L (ref 20–29)
CREAT SERPL-MCNC: 1.35 MG/DL (ref 0.76–1.27)
EGFRCR SERPLBLD CKD-EPI 2021: 54 ML/MIN/1.73
EOSINOPHIL # BLD AUTO: 0.2 X10E3/UL (ref 0–0.4)
EOSINOPHIL NFR BLD AUTO: 3 %
ERYTHROCYTE [DISTWIDTH] IN BLOOD BY AUTOMATED COUNT: 12.6 % (ref 11.6–15.4)
FOLATE SERPL-MCNC: 5.7 NG/ML
GLOBULIN SER CALC-MCNC: 2.6 G/DL (ref 1.5–4.5)
GLUCOSE SERPL-MCNC: 104 MG/DL (ref 70–99)
HCT VFR BLD AUTO: 44.5 % (ref 37.5–51)
HDLC SERPL-MCNC: 33 MG/DL
HGB BLD-MCNC: 15 G/DL (ref 13–17.7)
IMM GRANULOCYTES # BLD AUTO: 0 X10E3/UL (ref 0–0.1)
IMM GRANULOCYTES NFR BLD AUTO: 0 %
LDLC SERPL CALC-MCNC: 92 MG/DL (ref 0–99)
LYMPHOCYTES # BLD AUTO: 1.7 X10E3/UL (ref 0.7–3.1)
LYMPHOCYTES NFR BLD AUTO: 22 %
MCH RBC QN AUTO: 29.9 PG (ref 26.6–33)
MCHC RBC AUTO-ENTMCNC: 33.7 G/DL (ref 31.5–35.7)
MCV RBC AUTO: 89 FL (ref 79–97)
MONOCYTES # BLD AUTO: 0.7 X10E3/UL (ref 0.1–0.9)
MONOCYTES NFR BLD AUTO: 9 %
NEUTROPHILS # BLD AUTO: 4.9 X10E3/UL (ref 1.4–7)
NEUTROPHILS NFR BLD AUTO: 65 %
PLATELET # BLD AUTO: 293 X10E3/UL (ref 150–450)
POTASSIUM SERPL-SCNC: 4.5 MMOL/L (ref 3.5–5.2)
PROT SERPL-MCNC: 7.3 G/DL (ref 6–8.5)
PSA SERPL-MCNC: 0.8 NG/ML (ref 0–4)
RBC # BLD AUTO: 5.01 X10E6/UL (ref 4.14–5.8)
SODIUM SERPL-SCNC: 138 MMOL/L (ref 134–144)
TRIGL SERPL-MCNC: 97 MG/DL (ref 0–149)
TSH SERPL DL<=0.005 MIU/L-ACNC: 1.89 UIU/ML (ref 0.45–4.5)
VIT B12 SERPL-MCNC: 581 PG/ML (ref 232–1245)
VLDLC SERPL CALC-MCNC: 18 MG/DL (ref 5–40)
WBC # BLD AUTO: 7.6 X10E3/UL (ref 3.4–10.8)

## 2023-01-10 ENCOUNTER — OFFICE VISIT (OUTPATIENT)
Dept: FAMILY MEDICINE CLINIC | Facility: CLINIC | Age: 78
End: 2023-01-10
Payer: MEDICARE

## 2023-01-10 VITALS
WEIGHT: 194 LBS | OXYGEN SATURATION: 97 % | SYSTOLIC BLOOD PRESSURE: 128 MMHG | HEIGHT: 72 IN | DIASTOLIC BLOOD PRESSURE: 80 MMHG | HEART RATE: 60 BPM | BODY MASS INDEX: 26.28 KG/M2

## 2023-01-10 DIAGNOSIS — F41.9 ANXIETY: Primary | ICD-10-CM

## 2023-01-10 DIAGNOSIS — F32.9 DEPRESSION, REACTIVE: ICD-10-CM

## 2023-01-10 DIAGNOSIS — N18.31 STAGE 3A CHRONIC KIDNEY DISEASE: ICD-10-CM

## 2023-01-10 DIAGNOSIS — F51.01 PRIMARY INSOMNIA: ICD-10-CM

## 2023-01-10 PROCEDURE — 99214 OFFICE O/P EST MOD 30 MIN: CPT | Performed by: FAMILY MEDICINE

## 2023-01-10 RX ORDER — ESCITALOPRAM OXALATE 10 MG/1
10 TABLET ORAL EVERY MORNING
Qty: 30 TABLET | Refills: 5 | Status: SHIPPED | OUTPATIENT
Start: 2023-01-10 | End: 2023-03-13

## 2023-01-10 NOTE — PROGRESS NOTES
Office Note     Name: Omar Waite    : 1945     MRN: [id number]     Chief Complaint  Follow-up    Subjective     History of Present Illness:  Omar Waite is a 77 y.o. male who presents today for follow-up on the Lexapro 5 mg every morning.  He seems to be taking 100 mg or 50 mg at night and still dreaming.  He has a half of Valium at night.  He takes Lexapro at night told him to move it to 8 AM.  Notices a bit of shakiness in the morning.  It will decrease to worry if we move it up to 10 mg every morning, Lexapro.  He will gradually take away the trazodone.  His blood work was drawn over he has a stage III kidney disease    Review of Systems:   Review of Systems    Past Medical History:   Past Medical History:   Diagnosis Date   • CAD (coronary artery disease)    • Chronic prostatitis    • Coronary arteriosclerosis in native artery    • GERD with esophagitis     AND HEMORRHAGE   • Heart attack (HCC)     H/O MI   • High risk medication use    • Hypertension    • Insomnia due to anxiety and fear    • Malaise and fatigue    • Megaloblastic anemia    • Mixed hyperlipidemia    • Mood insomnia (HCC)    • Phobic disorder    • Skin cancer    • Vitamin D deficiency        Past Surgical History:   Past Surgical History:   Procedure Laterality Date   • CHOLECYSTECTOMY         Family History:   Family History   Problem Relation Age of Onset   • Coronary artery disease Mother    • Alzheimer's disease Father        Social History:   Social History     Socioeconomic History   • Marital status:    Tobacco Use   • Smoking status: Former     Packs/day: 3.00     Years: 46.00     Pack years: 138.00     Types: Cigarettes     Start date:      Quit date:      Years since quittin.0   • Smokeless tobacco: Never   Vaping Use   • Vaping Use: Never used       Immunizations:   Immunization History   Administered Date(s) Administered   • COVID-19 (MODERNA) 1st, 2nd, 3rd Dose Only 2021,  02/23/2021   • COVID-19 (MODERNA) BOOSTER 10/29/2021   • Fluzone High-Dose 65+yrs 10/27/2022   • Hepatitis A 11/05/2018, 05/22/2019   • Influenza, Unspecified 10/29/2020   • Pneumococcal Conjugate 13-Valent (PCV13) 12/28/2020   • Pneumococcal Polysaccharide (PPSV23) 04/09/2015, 11/02/2018   • Td 03/22/2012   • Tdap 04/19/2015   • Zostavax 11/02/2015   • Zoster, Unspecified 01/01/2016        Medications:     Current Outpatient Medications:   •  ASPIRIN 81 PO, 81 mg., Disp: , Rfl:   •  atorvastatin (LIPITOR) 80 MG tablet, Take 80 mg by mouth every night at bedtime., Disp: , Rfl:   •  diazePAM (VALIUM) 5 MG tablet, Take 1 tablet by mouth Every 12 (Twelve) Hours As Needed for Anxiety., Disp: 60 tablet, Rfl: 2  •  escitalopram (Lexapro) 10 MG tablet, Take 1 tablet by mouth Every Morning., Disp: 30 tablet, Rfl: 5  •  fenofibrate 160 MG tablet, Take 160 mg by mouth Daily., Disp: , Rfl:   •  omeprazole (priLOSEC) 40 MG capsule, Take 40 mg by mouth Daily. before a meal, Disp: , Rfl:   •  traZODone (DESYREL) 50 MG tablet, TAKE 3 TABLETS BY MOUTH ONCE DAILY AT NIGHT AT BEDTIME AS DIRECTED, Disp: 270 tablet, Rfl: 0  •  guaiFENesin-codeine (GUAIFENESIN AC) 100-10 MG/5ML liquid, Take 5 mL by mouth 3 (Three) Times a Day As Needed for Cough., Disp: 120 mL, Rfl: 0    Allergies:   No Known Allergies    Objective     Vital Signs  /80   Pulse 60   Ht 182.9 cm (72\")   Wt 88 kg (194 lb)   SpO2 97%   BMI 26.31 kg/m²   Estimated body mass index is 26.31 kg/m² as calculated from the following:    Height as of this encounter: 182.9 cm (72\").    Weight as of this encounter: 88 kg (194 lb).          Physical Exam  Vitals and nursing note reviewed.   Constitutional:       Appearance: Normal appearance. He is obese.   HENT:      Head: Normocephalic.      Right Ear: External ear normal.      Left Ear: External ear normal.      Nose: Nose normal.   Musculoskeletal:      Cervical back: Normal range of motion and neck supple.   Skin:      General: Skin is warm and dry.   Neurological:      Mental Status: He is alert.   Psychiatric:         Mood and Affect: Mood normal.          Procedures     Assessment and Plan     1. Anxiety  Should be helped by the Lexapro    2. Primary insomnia  Should be helped by the Lexapro    3. Depression, reactive  Depression should be helped with Lexapro    4. Stage 3a chronic kidney disease (HCC)  Noted Tylenol only, his wife is on dialysis, recheck in 6 weeks       Follow Up  Return in about 6 weeks (around 2/21/2023).    Omar ADRIAN [facility]  [address] [facility] [id number] [phone]

## 2023-02-21 ENCOUNTER — OFFICE VISIT (OUTPATIENT)
Dept: FAMILY MEDICINE CLINIC | Facility: CLINIC | Age: 78
End: 2023-02-21
Payer: MEDICARE

## 2023-02-21 VITALS
SYSTOLIC BLOOD PRESSURE: 118 MMHG | BODY MASS INDEX: 27.16 KG/M2 | HEART RATE: 73 BPM | WEIGHT: 194 LBS | HEIGHT: 71 IN | OXYGEN SATURATION: 98 % | DIASTOLIC BLOOD PRESSURE: 78 MMHG

## 2023-02-21 DIAGNOSIS — K21.00 GASTROESOPHAGEAL REFLUX DISEASE WITH ESOPHAGITIS WITHOUT HEMORRHAGE: ICD-10-CM

## 2023-02-21 DIAGNOSIS — F41.9 ANXIETY: Primary | ICD-10-CM

## 2023-02-21 DIAGNOSIS — Z79.899 ENCOUNTER FOR LONG-TERM (CURRENT) USE OF OTHER MEDICATIONS: ICD-10-CM

## 2023-02-21 DIAGNOSIS — E78.2 MIXED HYPERLIPIDEMIA: ICD-10-CM

## 2023-02-21 DIAGNOSIS — N40.1 BENIGN PROSTATIC HYPERPLASIA WITH LOWER URINARY TRACT SYMPTOMS, SYMPTOM DETAILS UNSPECIFIED: ICD-10-CM

## 2023-02-21 DIAGNOSIS — F51.01 PRIMARY INSOMNIA: ICD-10-CM

## 2023-02-21 PROCEDURE — 99213 OFFICE O/P EST LOW 20 MIN: CPT | Performed by: FAMILY MEDICINE

## 2023-02-21 RX ORDER — TAMSULOSIN HYDROCHLORIDE 0.4 MG/1
CAPSULE ORAL EVERY 24 HOURS
COMMUNITY

## 2023-02-21 NOTE — PROGRESS NOTES
Office Note     Name: Omar Waite    : 1945     MRN: [id number]     Chief Complaint  Anxiety    Subjective     History of Present Illness:  Omar Waite is a 77 y.o. male who presents today for for follow-up on his anxiety and depression.  He lives alone Lexapro 10 mg so far he is back and down the Valium 1 a day and 2 trazodone.  Asked that he back down to half of Valium and get up and about sleeping better 4 to 5 hours at a time    Review of Systems:   Review of Systems    Past Medical History:   Past Medical History:   Diagnosis Date   • CAD (coronary artery disease)    • Chronic prostatitis    • Coronary arteriosclerosis in native artery    • GERD with esophagitis     AND HEMORRHAGE   • Heart attack (HCC)     H/O MI   • High risk medication use    • Hypertension    • Insomnia due to anxiety and fear    • Malaise and fatigue    • Megaloblastic anemia    • Mixed hyperlipidemia    • Mood insomnia (HCC)    • Phobic disorder    • Skin cancer    • Vitamin D deficiency        Past Surgical History:   Past Surgical History:   Procedure Laterality Date   • CHOLECYSTECTOMY         Family History:   Family History   Problem Relation Age of Onset   • Coronary artery disease Mother    • Alzheimer's disease Father        Social History:   Social History     Socioeconomic History   • Marital status:    Tobacco Use   • Smoking status: Former     Packs/day: 3.00     Years: 46.00     Pack years: 138.00     Types: Cigarettes     Start date:      Quit date:      Years since quittin.1   • Smokeless tobacco: Never   Vaping Use   • Vaping Use: Never used       Immunizations:   Immunization History   Administered Date(s) Administered   • COVID-19 (MODERNA) 1st, 2nd, 3rd Dose Only 2021, 2021   • COVID-19 (MODERNA) BOOSTER 10/29/2021   • Fluzone High-Dose 65+yrs 10/27/2022   • Hepatitis A 2018, 2019   • Influenza, Unspecified 10/29/2020   • Pneumococcal Conjugate  "13-Valent (PCV13) 12/28/2020   • Pneumococcal Polysaccharide (PPSV23) 04/09/2015, 11/02/2018   • Td 03/22/2012   • Tdap 04/19/2015   • Zostavax 11/02/2015   • Zoster, Unspecified 01/01/2016        Medications:     Current Outpatient Medications:   •  ASPIRIN 81 PO, 81 mg., Disp: , Rfl:   •  atorvastatin (LIPITOR) 80 MG tablet, Take 80 mg by mouth every night at bedtime., Disp: , Rfl:   •  diazePAM (VALIUM) 5 MG tablet, Take 1 tablet by mouth Every 12 (Twelve) Hours As Needed for Anxiety., Disp: 60 tablet, Rfl: 2  •  escitalopram (Lexapro) 10 MG tablet, Take 1 tablet by mouth Every Morning., Disp: 30 tablet, Rfl: 5  •  fenofibrate 160 MG tablet, Take 160 mg by mouth Daily., Disp: , Rfl:   •  omeprazole (priLOSEC) 40 MG capsule, Take 40 mg by mouth Daily. before a meal, Disp: , Rfl:   •  tamsulosin (FLOMAX) 0.4 MG capsule 24 hr capsule, Daily., Disp: , Rfl:   •  traZODone (DESYREL) 50 MG tablet, TAKE 3 TABLETS BY MOUTH ONCE DAILY AT NIGHT AT BEDTIME AS DIRECTED, Disp: 270 tablet, Rfl: 0    Allergies:   No Known Allergies    Objective     Vital Signs  /78   Pulse 73   Ht 180.3 cm (71\")   Wt 88 kg (194 lb)   SpO2 98%   BMI 27.06 kg/m²   Estimated body mass index is 27.06 kg/m² as calculated from the following:    Height as of this encounter: 180.3 cm (71\").    Weight as of this encounter: 88 kg (194 lb).          Physical Exam  Vitals and nursing note reviewed.   Constitutional:       Appearance: Normal appearance. He is obese.   HENT:      Head: Normocephalic.   Neurological:      General: No focal deficit present.      Mental Status: He is alert.   Psychiatric:         Mood and Affect: Mood normal.          Procedures     Assessment and Plan     1. Anxiety  Continue the Lexapro 10 mg every morning and recheck in 5 months    2. Primary insomnia  Asked that he take I have a night Valium    3. Encounter for long-term (current) use of other medications      4. Benign prostatic hyperplasia with lower urinary " tract symptoms, symptom details unspecified  Controlled    5. Mixed hyperlipidemia  Controlled    6. Gastroesophageal reflux disease with esophagitis without hemorrhage  Controlled except as he was coughing there may be a irritation to the pharynx       Follow Up  Return in about 5 months (around 7/10/2023).    Omar ADRIAN [facility]  [address] [facility] [id number] [phone]

## 2023-03-13 ENCOUNTER — OFFICE VISIT (OUTPATIENT)
Dept: FAMILY MEDICINE CLINIC | Facility: CLINIC | Age: 78
End: 2023-03-13
Payer: MEDICARE

## 2023-03-13 VITALS
OXYGEN SATURATION: 98 % | HEIGHT: 71 IN | DIASTOLIC BLOOD PRESSURE: 92 MMHG | HEART RATE: 45 BPM | SYSTOLIC BLOOD PRESSURE: 162 MMHG | BODY MASS INDEX: 26.88 KG/M2 | WEIGHT: 192 LBS

## 2023-03-13 DIAGNOSIS — F41.1 GENERALIZED ANXIETY DISORDER: Primary | ICD-10-CM

## 2023-03-13 PROCEDURE — 99213 OFFICE O/P EST LOW 20 MIN: CPT | Performed by: NURSE PRACTITIONER

## 2023-03-13 RX ORDER — BUSPIRONE HYDROCHLORIDE 10 MG/1
10 TABLET ORAL 2 TIMES DAILY
Qty: 60 TABLET | Refills: 2 | Status: SHIPPED | OUTPATIENT
Start: 2023-03-13

## 2023-03-13 RX ORDER — DIAZEPAM 5 MG/1
5 TABLET ORAL EVERY 12 HOURS PRN
Qty: 60 TABLET | Refills: 2 | Status: SHIPPED | OUTPATIENT
Start: 2023-03-13

## 2023-03-13 NOTE — PROGRESS NOTES
"Chief Complaint  Anxiety (Panic attacks/ anxious. Says the only that helps is his prostate being massaged )    Subjective          Omar Waite presents to [facility]  History of Present Illness  Pt has had increased anxiety and panic attacks for the past few weeks. Dr Willard has been adjusting his medications and tapering down his Valium. He has had increased anxiety since then. He feels that when his anxiety is exacerbated, he needs a prostate massage to relieve this.       Objective   Vital Signs:   /92   Pulse (!) 45   Ht 180.3 cm (71\")   Wt 87.1 kg (192 lb)   SpO2 98%   BMI 26.78 kg/m²     Body mass index is 26.78 kg/m².    Review of Systems   Constitutional: Negative for fatigue and fever.   Respiratory: Negative for shortness of breath.    Cardiovascular: Negative for chest pain, palpitations and leg swelling.   Neurological: Negative for syncope.   Psychiatric/Behavioral: The patient is nervous/anxious.           Current Outpatient Medications:   •  ASPIRIN 81 PO, 81 mg., Disp: , Rfl:   •  atorvastatin (LIPITOR) 80 MG tablet, Take 1 tablet by mouth every night at bedtime., Disp: , Rfl:   •  diazePAM (VALIUM) 5 MG tablet, Take 1 tablet by mouth Every 12 (Twelve) Hours As Needed for Anxiety., Disp: 60 tablet, Rfl: 2  •  fenofibrate 160 MG tablet, Take 1 tablet by mouth Daily., Disp: , Rfl:   •  omeprazole (priLOSEC) 40 MG capsule, Take 1 capsule by mouth Daily. before a meal, Disp: , Rfl:   •  tamsulosin (FLOMAX) 0.4 MG capsule 24 hr capsule, Daily., Disp: , Rfl:   •  traZODone (DESYREL) 50 MG tablet, TAKE 3 TABLETS BY MOUTH ONCE DAILY AT NIGHT AT BEDTIME AS DIRECTED, Disp: 270 tablet, Rfl: 0  •  busPIRone (BUSPAR) 10 MG tablet, Take 1 tablet by mouth 2 (Two) Times a Day., Disp: 60 tablet, Rfl: 2      Allergies: Patient has no known allergies.    Physical Exam  Constitutional:       Appearance: Normal appearance.   HENT:      Head: Normocephalic.   Eyes:      " Conjunctiva/sclera: Conjunctivae normal.      Pupils: Pupils are equal, round, and reactive to light.   Cardiovascular:      Rate and Rhythm: Normal rate and regular rhythm.      Heart sounds: Normal heart sounds.   Pulmonary:      Effort: Pulmonary effort is normal.      Breath sounds: Normal breath sounds.   Abdominal:      Tenderness: There is no abdominal tenderness.   Musculoskeletal:         General: Normal range of motion.   Skin:     General: Skin is warm and dry.      Capillary Refill: Capillary refill takes less than 2 seconds.   Neurological:      General: No focal deficit present.      Mental Status: He is alert and oriented to person, place, and time.   Psychiatric:         Mood and Affect: Mood normal.         Behavior: Behavior normal.         Thought Content: Thought content normal.         Judgment: Judgment normal.          Result Review :                   Assessment and Plan    Diagnoses and all orders for this visit:    1. Generalized anxiety disorder (Primary)  Comments:  May go back to 1 whole tablet at HS and can take 1/2 tablet in the am for this week. Stop Lexapro and try Buspar. F/U with Dr Willard in 1 week.   Orders:  -     diazePAM (VALIUM) 5 MG tablet; Take 1 tablet by mouth Every 12 (Twelve) Hours As Needed for Anxiety.  Dispense: 60 tablet; Refill: 2  -     busPIRone (BUSPAR) 10 MG tablet; Take 1 tablet by mouth 2 (Two) Times a Day.  Dispense: 60 tablet; Refill: 2                Follow Up   No follow-ups on file.  Patient was given instructions and counseling regarding his condition or for health maintenance advice. Please see specific information pulled into the AVS if appropriate.     FAUSTINO Hamilton

## 2023-03-20 ENCOUNTER — OFFICE VISIT (OUTPATIENT)
Dept: FAMILY MEDICINE CLINIC | Facility: CLINIC | Age: 78
End: 2023-03-20
Payer: MEDICARE

## 2023-03-20 VITALS
OXYGEN SATURATION: 100 % | WEIGHT: 190 LBS | BODY MASS INDEX: 26.6 KG/M2 | SYSTOLIC BLOOD PRESSURE: 160 MMHG | DIASTOLIC BLOOD PRESSURE: 96 MMHG | HEART RATE: 98 BPM | HEIGHT: 71 IN

## 2023-03-20 DIAGNOSIS — F41.8 DEPRESSION WITH ANXIETY: ICD-10-CM

## 2023-03-20 DIAGNOSIS — N40.1 BENIGN PROSTATIC HYPERPLASIA WITH LOWER URINARY TRACT SYMPTOMS, SYMPTOM DETAILS UNSPECIFIED: ICD-10-CM

## 2023-03-20 DIAGNOSIS — R03.0 BLOOD PRESSURE ELEVATED WITHOUT HISTORY OF HTN: ICD-10-CM

## 2023-03-20 DIAGNOSIS — F51.01 PRIMARY INSOMNIA: ICD-10-CM

## 2023-03-20 DIAGNOSIS — F41.9 ANXIETY: Primary | ICD-10-CM

## 2023-03-20 PROCEDURE — 1159F MED LIST DOCD IN RCRD: CPT | Performed by: FAMILY MEDICINE

## 2023-03-20 PROCEDURE — 1160F RVW MEDS BY RX/DR IN RCRD: CPT | Performed by: FAMILY MEDICINE

## 2023-03-20 PROCEDURE — 99214 OFFICE O/P EST MOD 30 MIN: CPT | Performed by: FAMILY MEDICINE

## 2023-03-20 NOTE — PROGRESS NOTES
Office Note     Name: Omar Waite    : 1945     MRN: [id number]     Chief Complaint  Anxiety (Pt complains of anxiety today. ) and Prostate Check    Subjective     History of Present Illness:  Omar Waite is a 77 y.o. male who presents today for anxiety and prostate check.  He keeps waking up at 3-4 and mind racing.  He made a visit today with Dr. Adam she DC'd escitalopram and started BuSpar.  Has not taken them since (bought them).  He made a trip to 5 days ago to the ER pressure 181/100.  Wife has an office appointment every day this week.  They did send troponins and proBNP negative.    Review of Systems:   Review of Systems    Past Medical History:   Past Medical History:   Diagnosis Date   • CAD (coronary artery disease)    • Chronic prostatitis    • Coronary arteriosclerosis in native artery    • GERD with esophagitis     AND HEMORRHAGE   • Heart attack (HCC)     H/O MI   • High risk medication use    • Hypertension    • Insomnia due to anxiety and fear    • Malaise and fatigue    • Megaloblastic anemia    • Mixed hyperlipidemia    • Mood insomnia (HCC)    • Phobic disorder    • Skin cancer    • Vitamin D deficiency        Past Surgical History:   Past Surgical History:   Procedure Laterality Date   • CHOLECYSTECTOMY         Family History:   Family History   Problem Relation Age of Onset   • Coronary artery disease Mother    • Alzheimer's disease Father        Social History:   Social History     Socioeconomic History   • Marital status:    Tobacco Use   • Smoking status: Former     Packs/day: 3.00     Years: 46.00     Pack years: 138.00     Types: Cigarettes     Start date:      Quit date:      Years since quittin.2   • Smokeless tobacco: Never   Vaping Use   • Vaping Use: Never used   Substance and Sexual Activity   • Drug use: Defer   • Sexual activity: Defer       Immunizations:   Immunization History   Administered Date(s) Administered   • COVID-19  "(MODERNA) 1st, 2nd, 3rd Dose Only 01/14/2021, 02/23/2021   • COVID-19 (MODERNA) BOOSTER 10/29/2021   • Fluzone High-Dose 65+yrs 10/27/2022   • Hepatitis A 11/05/2018, 05/22/2019   • Influenza, Unspecified 10/29/2020   • Pneumococcal Conjugate 13-Valent (PCV13) 12/28/2020   • Pneumococcal Polysaccharide (PPSV23) 04/09/2015, 11/02/2018   • Td 03/22/2012   • Tdap 04/19/2015   • Zostavax 11/02/2015   • Zoster, Unspecified 01/01/2016        Medications:     Current Outpatient Medications:   •  ASPIRIN 81 PO, 81 mg., Disp: , Rfl:   •  atorvastatin (LIPITOR) 80 MG tablet, Take 1 tablet by mouth every night at bedtime., Disp: , Rfl:   •  diazePAM (VALIUM) 5 MG tablet, Take 1 tablet by mouth Every 12 (Twelve) Hours As Needed for Anxiety., Disp: 60 tablet, Rfl: 2  •  fenofibrate 160 MG tablet, Take 1 tablet by mouth Daily., Disp: , Rfl:   •  omeprazole (priLOSEC) 40 MG capsule, Take 1 capsule by mouth Daily. before a meal, Disp: , Rfl:   •  tamsulosin (FLOMAX) 0.4 MG capsule 24 hr capsule, Daily., Disp: , Rfl:   •  traZODone (DESYREL) 50 MG tablet, TAKE 3 TABLETS BY MOUTH ONCE DAILY AT NIGHT AT BEDTIME AS DIRECTED, Disp: 270 tablet, Rfl: 0  •  busPIRone (BUSPAR) 10 MG tablet, Take 1 tablet by mouth 2 (Two) Times a Day. (Patient not taking: Reported on 3/20/2023), Disp: 60 tablet, Rfl: 2    Allergies:   No Known Allergies    Objective     Vital Signs  /96   Pulse 98   Ht 180.3 cm (71\")   Wt 86.2 kg (190 lb)   SpO2 100%   BMI 26.50 kg/m²   Estimated body mass index is 26.5 kg/m² as calculated from the following:    Height as of this encounter: 180.3 cm (71\").    Weight as of this encounter: 86.2 kg (190 lb).          Physical Exam  Constitutional:       Appearance: Normal appearance. He is obese.   HENT:      Head: Normocephalic.      Right Ear: External ear normal.      Left Ear: External ear normal.      Nose: Nose normal.      Mouth/Throat:      Mouth: Mucous membranes are moist.   Eyes:      Pupils: Pupils are " equal, round, and reactive to light.   Cardiovascular:      Rate and Rhythm: Normal rate and regular rhythm.   Genitourinary:     Prostate: Normal.      Rectum: Normal.   Neurological:      Mental Status: He is alert.          Procedures     Assessment and Plan     1. Anxiety  Stop the escitalopram, start BuSpar 10 mg twice daily and take a Valium a whole pill tonight recheck in 2 weeks    2. Primary insomnia  Ongoing problem    3. Benign prostatic hyperplasia with lower urinary tract symptoms, symptom details   Boggy 2-3+  4. Depression with anxiety  Anxious now    5. Blood pressure elevated without history of HTN  Blood pressure elevated, seems to me he cannot relax.       Follow Up  Return in about 2 weeks (around 4/3/2023).    Omar ADRIAN [facility]  [address] [facility] [address] [phone]

## 2023-04-03 RX ORDER — OMEPRAZOLE 40 MG/1
CAPSULE, DELAYED RELEASE ORAL
Qty: 90 CAPSULE | Refills: 0 | Status: SHIPPED | OUTPATIENT
Start: 2023-04-03

## 2023-04-04 ENCOUNTER — OFFICE VISIT (OUTPATIENT)
Dept: FAMILY MEDICINE CLINIC | Facility: CLINIC | Age: 78
End: 2023-04-04
Payer: MEDICARE

## 2023-04-04 VITALS
WEIGHT: 193 LBS | DIASTOLIC BLOOD PRESSURE: 82 MMHG | HEIGHT: 71 IN | HEART RATE: 49 BPM | BODY MASS INDEX: 27.02 KG/M2 | SYSTOLIC BLOOD PRESSURE: 140 MMHG | OXYGEN SATURATION: 98 %

## 2023-04-04 DIAGNOSIS — R03.0 BLOOD PRESSURE ELEVATED WITHOUT HISTORY OF HTN: ICD-10-CM

## 2023-04-04 DIAGNOSIS — F41.9 ANXIETY: Primary | ICD-10-CM

## 2023-04-04 PROCEDURE — 99213 OFFICE O/P EST LOW 20 MIN: CPT | Performed by: FAMILY MEDICINE

## 2023-04-04 PROCEDURE — 1160F RVW MEDS BY RX/DR IN RCRD: CPT | Performed by: FAMILY MEDICINE

## 2023-04-04 PROCEDURE — 1159F MED LIST DOCD IN RCRD: CPT | Performed by: FAMILY MEDICINE

## 2023-04-04 RX ORDER — CLONIDINE HYDROCHLORIDE 0.1 MG/1
TABLET ORAL
COMMUNITY
Start: 2023-03-16

## 2023-04-04 NOTE — PROGRESS NOTES
Office Note     Name: Omar Waite    : 1945     MRN: [id number]     Chief Complaint  Anxiety    Subjective     History of Present Illness:  Omar Waite is a 77 y.o. male who presents today for anxiety.  Has had not had a spell yet sleeping better taking 3 trazodone.  Is not ready yet take 5 mg of Valium x30 days and then break in half x30 days and see me in 8 weeks    Review of Systems:   Review of Systems    Past Medical History:   Past Medical History:   Diagnosis Date   • CAD (coronary artery disease)    • Chronic prostatitis    • Coronary arteriosclerosis in native artery    • GERD with esophagitis     AND HEMORRHAGE   • Heart attack     H/O MI   • High risk medication use    • Hypertension    • Insomnia due to anxiety and fear    • Malaise and fatigue    • Megaloblastic anemia    • Mixed hyperlipidemia    • Mood insomnia    • Phobic disorder    • Skin cancer    • Vitamin D deficiency        Past Surgical History:   Past Surgical History:   Procedure Laterality Date   • CHOLECYSTECTOMY         Family History:   Family History   Problem Relation Age of Onset   • Coronary artery disease Mother    • Alzheimer's disease Father        Social History:   Social History     Socioeconomic History   • Marital status:    Tobacco Use   • Smoking status: Former     Packs/day: 3.00     Years: 46.00     Pack years: 138.00     Types: Cigarettes     Start date:      Quit date:      Years since quittin.2   • Smokeless tobacco: Never   Vaping Use   • Vaping Use: Never used   Substance and Sexual Activity   • Drug use: Defer   • Sexual activity: Defer       Immunizations:   Immunization History   Administered Date(s) Administered   • COVID-19 (MODERNA) 1st, 2nd, 3rd Dose Only 2021, 2021   • COVID-19 (MODERNA) BOOSTER 10/29/2021   • Fluzone High-Dose 65+yrs 10/27/2022   • Hepatitis A 2018, 2019   • Influenza, Unspecified 10/29/2020   • Pneumococcal Conjugate  "13-Valent (PCV13) 12/28/2020   • Pneumococcal Polysaccharide (PPSV23) 04/09/2015, 11/02/2018   • Td 03/22/2012   • Tdap 04/19/2015   • Zostavax 11/02/2015   • Zoster, Unspecified 01/01/2016        Medications:     Current Outpatient Medications:   •  ASPIRIN 81 PO, 81 mg., Disp: , Rfl:   •  atorvastatin (LIPITOR) 80 MG tablet, Take 1 tablet by mouth every night at bedtime., Disp: , Rfl:   •  busPIRone (BUSPAR) 10 MG tablet, Take 1 tablet by mouth 2 (Two) Times a Day., Disp: 60 tablet, Rfl: 2  •  cloNIDine (CATAPRES) 0.1 MG tablet, TAKE 1 TABLET BY MOUTH TWICE DAILY AS NEEDED FOR SBP GREATER THAN 180 OR DBP GREATER THAN 100, Disp: , Rfl:   •  diazePAM (VALIUM) 5 MG tablet, Take 1 tablet by mouth Every 12 (Twelve) Hours As Needed for Anxiety., Disp: 60 tablet, Rfl: 2  •  fenofibrate 160 MG tablet, Take 1 tablet by mouth Daily., Disp: , Rfl:   •  omeprazole (priLOSEC) 40 MG capsule, TAKE 1 CAPSULE BY MOUTH ONCE DAILY BEFORE A MEAL, Disp: 90 capsule, Rfl: 0  •  tamsulosin (FLOMAX) 0.4 MG capsule 24 hr capsule, Daily., Disp: , Rfl:   •  traZODone (DESYREL) 50 MG tablet, TAKE 3 TABLETS BY MOUTH ONCE DAILY AT NIGHT AT BEDTIME AS DIRECTED, Disp: 270 tablet, Rfl: 0    Allergies:   No Known Allergies    Objective     Vital Signs  /82   Pulse (!) 49   Ht 180.3 cm (71\")   Wt 87.5 kg (193 lb)   SpO2 98%   BMI 26.92 kg/m²   Estimated body mass index is 26.92 kg/m² as calculated from the following:    Height as of this encounter: 180.3 cm (71\").    Weight as of this encounter: 87.5 kg (193 lb).          Physical Exam  Constitutional:       Appearance: Normal appearance. He is obese.   Eyes:      Pupils: Pupils are equal, round, and reactive to light.   Cardiovascular:      Rate and Rhythm: Normal rate. Rhythm irregular. Frequent extrasystoles are present.  Musculoskeletal:      Cervical back: Normal range of motion.   Psychiatric:         Mood and Affect: Mood normal.         Behavior: Behavior normal.         Thought " Content: Thought content normal.         Judgment: Judgment normal.          Procedures     Assessment and Plan     1. Anxiety  BuSpar continue 10 mg twice daily    2. Blood pressure elevated without history of HTN  Recheck 72 pulse       Follow Up  Return in about 8 weeks (around 5/30/2023).    Omar ADRIAN PC [facility]  [address] [facility] [address] [phone]

## 2023-05-12 RX ORDER — TRAZODONE HYDROCHLORIDE 50 MG/1
TABLET ORAL
Qty: 270 TABLET | Refills: 0 | Status: SHIPPED | OUTPATIENT
Start: 2023-05-12

## 2023-06-06 ENCOUNTER — OFFICE VISIT (OUTPATIENT)
Dept: FAMILY MEDICINE CLINIC | Facility: CLINIC | Age: 78
End: 2023-06-06
Payer: MEDICARE

## 2023-06-06 VITALS
DIASTOLIC BLOOD PRESSURE: 80 MMHG | RESPIRATION RATE: 16 BRPM | BODY MASS INDEX: 27.59 KG/M2 | SYSTOLIC BLOOD PRESSURE: 130 MMHG | TEMPERATURE: 98.2 F | OXYGEN SATURATION: 97 % | HEART RATE: 79 BPM | WEIGHT: 197.1 LBS | HEIGHT: 71 IN

## 2023-06-06 DIAGNOSIS — F51.01 PRIMARY INSOMNIA: Primary | ICD-10-CM

## 2023-06-06 DIAGNOSIS — R03.0 BLOOD PRESSURE ELEVATED WITHOUT HISTORY OF HTN: ICD-10-CM

## 2023-06-06 DIAGNOSIS — E78.2 MIXED HYPERLIPIDEMIA: ICD-10-CM

## 2023-06-06 DIAGNOSIS — F41.1 GENERALIZED ANXIETY DISORDER: ICD-10-CM

## 2023-06-06 PROCEDURE — 1160F RVW MEDS BY RX/DR IN RCRD: CPT | Performed by: FAMILY MEDICINE

## 2023-06-06 PROCEDURE — 99214 OFFICE O/P EST MOD 30 MIN: CPT | Performed by: FAMILY MEDICINE

## 2023-06-06 PROCEDURE — 1159F MED LIST DOCD IN RCRD: CPT | Performed by: FAMILY MEDICINE

## 2023-06-06 RX ORDER — DIAZEPAM 5 MG/1
5 TABLET ORAL EVERY 12 HOURS PRN
Qty: 60 TABLET | Refills: 2 | Status: CANCELLED | OUTPATIENT
Start: 2023-06-06

## 2023-06-06 RX ORDER — TRAZODONE HYDROCHLORIDE 50 MG/1
100 TABLET ORAL NIGHTLY
Qty: 180 TABLET | Refills: 3 | Status: SHIPPED | OUTPATIENT
Start: 2023-06-06

## 2023-06-06 RX ORDER — OMEPRAZOLE 40 MG/1
40 CAPSULE, DELAYED RELEASE ORAL DAILY
Qty: 90 CAPSULE | Refills: 3 | Status: SHIPPED | OUTPATIENT
Start: 2023-06-06

## 2023-06-06 RX ORDER — BUSPIRONE HYDROCHLORIDE 10 MG/1
10 TABLET ORAL 2 TIMES DAILY
Qty: 60 TABLET | Refills: 5 | Status: SHIPPED | OUTPATIENT
Start: 2023-06-06

## 2023-06-06 RX ORDER — DIAZEPAM 5 MG/1
5 TABLET ORAL EVERY 12 HOURS PRN
Qty: 60 TABLET | Refills: 1 | Status: SHIPPED | OUTPATIENT
Start: 2023-06-06

## 2023-06-06 NOTE — PROGRESS NOTES
Office Note     Name: Omar Waite    : 1945     MRN: [id number]     Chief Complaint  Anxiety (Follow up)    Subjective     History of Present Illness:  Omar Waite is a 77 y.o. male who presents today for anxiety follow-up.  He is taking the BuSpar 10 mg twice daily and the trazodone lowered to 2 of the 50 mg.  He is going to to take half of Valium every night.  Let him go on the nightmares.  Refilled omeprazole    Review of Systems:   Review of Systems    Past Medical History:   Past Medical History:   Diagnosis Date    CAD (coronary artery disease)     Chronic prostatitis     Coronary arteriosclerosis in native artery     GERD with esophagitis     AND HEMORRHAGE    Heart attack     H/O MI    High risk medication use     Hypertension     Insomnia due to anxiety and fear     Malaise and fatigue     Megaloblastic anemia     Mixed hyperlipidemia     Mood insomnia     Phobic disorder     Skin cancer     Vitamin D deficiency        Past Surgical History:   Past Surgical History:   Procedure Laterality Date    CHOLECYSTECTOMY         Family History:   Family History   Problem Relation Age of Onset    Coronary artery disease Mother     Alzheimer's disease Father        Social History:   Social History     Socioeconomic History    Marital status:    Tobacco Use    Smoking status: Former     Packs/day: 3.00     Years: 46.00     Pack years: 138.00     Types: Cigarettes     Start date:      Quit date:      Years since quittin.4    Smokeless tobacco: Never   Vaping Use    Vaping Use: Never used   Substance and Sexual Activity    Drug use: Defer    Sexual activity: Defer       Immunizations:   Immunization History   Administered Date(s) Administered    COVID-19 (MODERNA) 1st,2nd,3rd Dose Monovalent 2021, 2021    COVID-19 (MODERNA) Monovalent Original Booster 10/29/2021    Fluzone High-Dose 65+yrs 10/27/2022    Hepatitis A 2018, 2019    Influenza, Unspecified  "10/29/2020    Pneumococcal Conjugate 13-Valent (PCV13) 12/28/2020    Pneumococcal Polysaccharide (PPSV23) 04/09/2015, 11/02/2018    Td 03/22/2012    Tdap 04/19/2015    Zostavax 11/02/2015    Zoster, Unspecified 01/01/2016        Medications:     Current Outpatient Medications:     ASPIRIN 81 PO, 81 mg., Disp: , Rfl:     atorvastatin (LIPITOR) 80 MG tablet, Take 1 tablet by mouth every night at bedtime., Disp: , Rfl:     busPIRone (BUSPAR) 10 MG tablet, Take 1 tablet by mouth 2 (Two) Times a Day., Disp: 60 tablet, Rfl: 5    diazePAM (VALIUM) 5 MG tablet, Take 1 tablet by mouth Every 12 (Twelve) Hours As Needed for Anxiety., Disp: 60 tablet, Rfl: 1    fenofibrate 160 MG tablet, Take 1 tablet by mouth Daily., Disp: , Rfl:     omeprazole (priLOSEC) 40 MG capsule, Take 1 capsule by mouth Daily. before a meal, Disp: 90 capsule, Rfl: 3    tamsulosin (FLOMAX) 0.4 MG capsule 24 hr capsule, Daily., Disp: , Rfl:     traZODone (DESYREL) 50 MG tablet, Take 2 tablets by mouth Every Night., Disp: 180 tablet, Rfl: 3    Allergies:   No Known Allergies    Objective     Vital Signs  /80   Pulse 79   Temp 98.2 °F (36.8 °C) (Infrared)   Resp 16   Ht 180.3 cm (70.98\")   Wt 89.4 kg (197 lb 1.6 oz)   SpO2 97%   BMI 27.50 kg/m²   Estimated body mass index is 27.5 kg/m² as calculated from the following:    Height as of this encounter: 180.3 cm (70.98\").    Weight as of this encounter: 89.4 kg (197 lb 1.6 oz).          Physical Exam  Vitals and nursing note reviewed.   Constitutional:       Appearance: Normal appearance. He is obese.   HENT:      Head: Normocephalic.      Right Ear: External ear normal.      Left Ear: External ear normal.      Nose: Nose normal.   Eyes:      Pupils: Pupils are equal, round, and reactive to light.   Cardiovascular:      Rate and Rhythm: Normal rate and regular rhythm.      Pulses: Normal pulses.      Heart sounds: Normal heart sounds.   Genitourinary:     Prostate: Normal.   Musculoskeletal:      " Cervical back: Normal range of motion and neck supple.   Skin:     General: Skin is warm and dry.   Neurological:      Mental Status: He is alert.   Psychiatric:         Mood and Affect: Mood normal.        Procedures     Assessment and Plan     1. Generalized anxiety disorder  Now he is supposed to decrease to 1/2 tablet of 5 mg Valium.  And laying the half milligram of Valium to try the sleep  - busPIRone (BUSPAR) 10 MG tablet; Take 1 tablet by mouth 2 (Two) Times a Day.  Dispense: 60 tablet; Refill: 5  - diazePAM (VALIUM) 5 MG tablet; Take 1 tablet by mouth Every 12 (Twelve) Hours As Needed for Anxiety.  Dispense: 60 tablet; Refill: 1    2. Primary insomnia  All to decrease his trazodone    3. Blood pressure elevated without history of HTN  Discontinue Catapres    4. Mixed hyperlipidemia  Atorvastatin       Follow Up  Return in about 6 months (around 12/6/2023).    Omar ADRIAN [facility]  [address] [place] [id number] [phone]

## 2023-07-27 ENCOUNTER — OFFICE VISIT (OUTPATIENT)
Dept: FAMILY MEDICINE CLINIC | Facility: CLINIC | Age: 78
End: 2023-07-27
Payer: MEDICARE

## 2023-07-27 VITALS
WEIGHT: 188.9 LBS | OXYGEN SATURATION: 96 % | DIASTOLIC BLOOD PRESSURE: 82 MMHG | SYSTOLIC BLOOD PRESSURE: 124 MMHG | HEART RATE: 77 BPM | BODY MASS INDEX: 26.36 KG/M2 | TEMPERATURE: 97.8 F

## 2023-07-27 DIAGNOSIS — K59.04 CHRONIC IDIOPATHIC CONSTIPATION: ICD-10-CM

## 2023-07-27 DIAGNOSIS — F41.1 GENERALIZED ANXIETY DISORDER: Primary | ICD-10-CM

## 2023-07-27 PROCEDURE — 99213 OFFICE O/P EST LOW 20 MIN: CPT | Performed by: NURSE PRACTITIONER

## 2023-07-27 RX ORDER — DOCUSATE SODIUM 100 MG/1
100 CAPSULE, LIQUID FILLED ORAL DAILY
Qty: 90 CAPSULE | Refills: 3 | Status: SHIPPED | OUTPATIENT
Start: 2023-07-27

## 2023-07-27 NOTE — PROGRESS NOTES
Chief Complaint  Follow-up    Subjective          Omar Waite presents to [facility]  History of Present Illness  Patient has had intermittent constipation for years.  Is a stool softener as needed which works well.  He feels that his anxiety is controlled on his current medications.    Objective   Vital Signs:   /82 (BP Location: Right arm, Patient Position: Sitting, Cuff Size: Adult)   Pulse 77   Temp 97.8 °F (36.6 °C) (Temporal)   Wt 85.7 kg (188 lb 14.4 oz)   SpO2 96%   BMI 26.36 kg/m²     Body mass index is 26.36 kg/m².    Review of Systems   Constitutional:  Negative for fatigue and fever.   Respiratory:  Negative for shortness of breath.    Cardiovascular:  Negative for chest pain, palpitations and leg swelling.   Neurological:  Negative for syncope.   Psychiatric/Behavioral:  The patient is not nervous/anxious.         Current Outpatient Medications:     ASPIRIN 81 PO, 81 mg., Disp: , Rfl:     atorvastatin (LIPITOR) 80 MG tablet, Take 1 tablet by mouth every night at bedtime., Disp: , Rfl:     busPIRone (BUSPAR) 10 MG tablet, Take 1 tablet by mouth 2 (Two) Times a Day., Disp: 60 tablet, Rfl: 5    diazePAM (VALIUM) 5 MG tablet, Take 1 tablet by mouth Every 12 (Twelve) Hours As Needed for Anxiety., Disp: 60 tablet, Rfl: 1    fenofibrate 160 MG tablet, Take 1 tablet by mouth Daily., Disp: , Rfl:     omeprazole (priLOSEC) 40 MG capsule, Take 1 capsule by mouth Daily. before a meal, Disp: 90 capsule, Rfl: 3    tamsulosin (FLOMAX) 0.4 MG capsule 24 hr capsule, Daily., Disp: , Rfl:     traZODone (DESYREL) 50 MG tablet, Take 2 tablets by mouth Every Night., Disp: 180 tablet, Rfl: 3    docusate sodium (Colace) 100 MG capsule, Take 1 capsule by mouth Daily., Disp: 90 capsule, Rfl: 3      Allergies: Patient has no known allergies.    Physical Exam  Constitutional:       Appearance: Normal appearance.   HENT:      Head: Normocephalic.   Eyes:      Conjunctiva/sclera:  Conjunctivae normal.      Pupils: Pupils are equal, round, and reactive to light.   Cardiovascular:      Rate and Rhythm: Normal rate and regular rhythm.      Heart sounds: Normal heart sounds.   Pulmonary:      Effort: Pulmonary effort is normal.      Breath sounds: Normal breath sounds.   Abdominal:      Tenderness: There is no abdominal tenderness.   Musculoskeletal:         General: Normal range of motion.   Skin:     General: Skin is warm and dry.      Capillary Refill: Capillary refill takes less than 2 seconds.   Neurological:      General: No focal deficit present.      Mental Status: He is alert and oriented to person, place, and time.   Psychiatric:         Mood and Affect: Mood normal.         Behavior: Behavior normal.         Thought Content: Thought content normal.         Judgment: Judgment normal.        Result Review :                   Assessment and Plan    Diagnoses and all orders for this visit:    1. Generalized anxiety disorder (Primary)  Comments:  Continue current medications.    2. Chronic idiopathic constipation  Comments:  Colace daily and use MiraLAX as needed for acute constipation.  Increase fluids and fiber in diet.  Orders:  -     docusate sodium (Colace) 100 MG capsule; Take 1 capsule by mouth Daily.  Dispense: 90 capsule; Refill: 3                Follow Up   No follow-ups on file.  Patient was given instructions and counseling regarding his condition or for health maintenance advice. Please see specific information pulled into the AVS if appropriate.     FAUSTINO Hamilton

## 2023-10-26 ENCOUNTER — OFFICE VISIT (OUTPATIENT)
Dept: FAMILY MEDICINE CLINIC | Facility: CLINIC | Age: 78
End: 2023-10-26
Payer: MEDICARE

## 2023-10-26 VITALS
HEIGHT: 71 IN | DIASTOLIC BLOOD PRESSURE: 82 MMHG | OXYGEN SATURATION: 98 % | HEART RATE: 77 BPM | SYSTOLIC BLOOD PRESSURE: 138 MMHG | BODY MASS INDEX: 27.44 KG/M2 | WEIGHT: 196 LBS

## 2023-10-26 DIAGNOSIS — J40 BRONCHITIS: Primary | ICD-10-CM

## 2023-10-26 DIAGNOSIS — N40.0 PROSTATISM: ICD-10-CM

## 2023-10-26 PROBLEM — I25.10 ARTERIOSCLEROSIS OF CORONARY ARTERY: Status: ACTIVE | Noted: 2021-07-06

## 2023-10-26 PROBLEM — I25.2 OLD MYOCARDIAL INFARCTION: Status: ACTIVE | Noted: 2021-07-06

## 2023-10-26 PROCEDURE — 1160F RVW MEDS BY RX/DR IN RCRD: CPT | Performed by: FAMILY MEDICINE

## 2023-10-26 PROCEDURE — 1159F MED LIST DOCD IN RCRD: CPT | Performed by: FAMILY MEDICINE

## 2023-10-26 PROCEDURE — 99214 OFFICE O/P EST MOD 30 MIN: CPT | Performed by: FAMILY MEDICINE

## 2023-10-26 RX ORDER — DOXYCYCLINE HYCLATE 100 MG/1
100 CAPSULE ORAL 2 TIMES DAILY
Qty: 20 CAPSULE | Refills: 0 | Status: SHIPPED | OUTPATIENT
Start: 2023-10-26

## 2023-10-26 NOTE — PROGRESS NOTES
Office Note     Name: Omar Waite    : 1945     MRN: [id number]     Chief Complaint  Cough (X2 weeks. ) and prostate issues (Pt wants his prostate checked)    Subjective     History of Present Illness:  Omar Waite is a 78 y.o. male who presents today for a cough.  He had 2 weeks.  Prostate issues.  Cough and and keeping up all night, thick, clear.    Review of Systems:   Review of Systems    Past Medical History:   Past Medical History:   Diagnosis Date    CAD (coronary artery disease)     Chronic prostatitis     Coronary arteriosclerosis in native artery     GERD with esophagitis     AND HEMORRHAGE    Heart attack     H/O MI    High risk medication use     Hypertension     Insomnia due to anxiety and fear     Malaise and fatigue     Megaloblastic anemia     Mixed hyperlipidemia     Mood insomnia     Phobic disorder     Skin cancer     Vitamin D deficiency        Past Surgical History:   Past Surgical History:   Procedure Laterality Date    CHOLECYSTECTOMY         Family History:   Family History   Problem Relation Age of Onset    Coronary artery disease Mother     Alzheimer's disease Father        Social History:   Social History     Socioeconomic History    Marital status:    Tobacco Use    Smoking status: Former     Packs/day: 3.00     Years: 46.00     Additional pack years: 0.00     Total pack years: 138.00     Types: Cigarettes     Start date:      Quit date:      Years since quittin.8     Passive exposure: Past    Smokeless tobacco: Never   Vaping Use    Vaping Use: Never used   Substance and Sexual Activity    Drug use: Defer    Sexual activity: Defer       Immunizations:   Immunization History   Administered Date(s) Administered    COVID-19 (MODERNA) 1st,2nd,3rd Dose Monovalent 2021, 2021    COVID-19 (MODERNA) Monovalent Original Booster 10/29/2021    Fluzone High-Dose 65+yrs 10/27/2022    Hepatitis A 2018, 2019    Influenza, Unspecified  "10/29/2020    Pneumococcal Conjugate 13-Valent (PCV13) 12/28/2020    Pneumococcal Polysaccharide (PPSV23) 04/09/2015, 11/02/2018    Td (TDVAX) 03/22/2012    Tdap 04/19/2015    Zostavax 11/02/2015    Zoster, Unspecified 01/01/2016        Medications:     Current Outpatient Medications:     ASPIRIN 81 PO, 81 mg., Disp: , Rfl:     atorvastatin (LIPITOR) 80 MG tablet, Take 1 tablet by mouth every night at bedtime., Disp: , Rfl:     busPIRone (BUSPAR) 10 MG tablet, Take 1 tablet by mouth 2 (Two) Times a Day., Disp: 60 tablet, Rfl: 5    diazePAM (VALIUM) 5 MG tablet, Take 1 tablet by mouth Every 12 (Twelve) Hours As Needed for Anxiety., Disp: 60 tablet, Rfl: 1    docusate sodium (Colace) 100 MG capsule, Take 1 capsule by mouth Daily., Disp: 90 capsule, Rfl: 3    omeprazole (priLOSEC) 40 MG capsule, Take 1 capsule by mouth Daily. before a meal, Disp: 90 capsule, Rfl: 3    tamsulosin (FLOMAX) 0.4 MG capsule 24 hr capsule, Daily., Disp: , Rfl:     traZODone (DESYREL) 50 MG tablet, Take 2 tablets by mouth Every Night., Disp: 180 tablet, Rfl: 3    doxycycline (VIBRAMYCIN) 100 MG capsule, Take 1 capsule by mouth 2 (Two) Times a Day., Disp: 20 capsule, Rfl: 0    fenofibrate 160 MG tablet, Take 1 tablet by mouth Daily. (Patient not taking: Reported on 10/26/2023), Disp: , Rfl:     Allergies:   No Known Allergies    Objective     Vital Signs  /82   Pulse 77   Ht 180.3 cm (70.98\")   Wt 88.9 kg (196 lb)   SpO2 98%   BMI 27.35 kg/m²   Estimated body mass index is 27.35 kg/m² as calculated from the following:    Height as of this encounter: 180.3 cm (70.98\").    Weight as of this encounter: 88.9 kg (196 lb).          Physical Exam  Vitals and nursing note reviewed.   HENT:      Head: Normocephalic and atraumatic.      Comments: Nares 2+ pale  Nose and throat clear     Right Ear: Tympanic membrane, ear canal and external ear normal.      Left Ear: Tympanic membrane, ear canal and external ear normal.      Nose: No congestion " or rhinorrhea.      Mouth/Throat:      Mouth: Mucous membranes are dry.   Eyes:      Extraocular Movements: Extraocular movements intact.      Conjunctiva/sclera: Conjunctivae normal.      Pupils: Pupils are equal, round, and reactive to light.   Cardiovascular:      Rate and Rhythm: Normal rate and regular rhythm.   Pulmonary:      Effort: Pulmonary effort is normal.      Breath sounds: Normal breath sounds.   Lymphadenopathy:      Cervical: No cervical adenopathy.   Skin:     General: Skin is warm and dry.   Neurological:      General: No focal deficit present.      Mental Status: He is alert.       Boggy on left, normal rt lobe.    Procedures     Assessment and Plan     1. Bronchitis  Doxycycline x10 days    2. Prostatism         Follow Up  No follow-ups on file.    Omar ADRIAN [facility]  [address] [facility] [address] [phone]

## 2023-11-20 DIAGNOSIS — F41.1 GENERALIZED ANXIETY DISORDER: ICD-10-CM

## 2023-11-20 RX ORDER — BUSPIRONE HYDROCHLORIDE 10 MG/1
10 TABLET ORAL 2 TIMES DAILY
Qty: 60 TABLET | Refills: 0 | Status: SHIPPED | OUTPATIENT
Start: 2023-11-20

## 2023-12-06 ENCOUNTER — OFFICE VISIT (OUTPATIENT)
Dept: FAMILY MEDICINE CLINIC | Facility: CLINIC | Age: 78
End: 2023-12-06
Payer: MEDICARE

## 2023-12-06 VITALS
HEART RATE: 60 BPM | SYSTOLIC BLOOD PRESSURE: 124 MMHG | DIASTOLIC BLOOD PRESSURE: 84 MMHG | BODY MASS INDEX: 27.3 KG/M2 | WEIGHT: 195 LBS | OXYGEN SATURATION: 96 % | HEIGHT: 71 IN

## 2023-12-06 DIAGNOSIS — H61.22 IMPACTED CERUMEN OF LEFT EAR: Primary | ICD-10-CM

## 2023-12-06 DIAGNOSIS — F41.1 GENERALIZED ANXIETY DISORDER: ICD-10-CM

## 2023-12-06 DIAGNOSIS — F51.01 PRIMARY INSOMNIA: ICD-10-CM

## 2023-12-06 DIAGNOSIS — R53.83 OTHER FATIGUE: ICD-10-CM

## 2023-12-06 DIAGNOSIS — E55.9 VITAMIN D DEFICIENCY: ICD-10-CM

## 2023-12-06 DIAGNOSIS — F41.8 DEPRESSION WITH ANXIETY: ICD-10-CM

## 2023-12-06 DIAGNOSIS — I10 PRIMARY HYPERTENSION: ICD-10-CM

## 2023-12-06 DIAGNOSIS — E78.2 MIXED HYPERLIPIDEMIA: ICD-10-CM

## 2023-12-06 DIAGNOSIS — N40.1 BENIGN PROSTATIC HYPERPLASIA WITH LOWER URINARY TRACT SYMPTOMS, SYMPTOM DETAILS UNSPECIFIED: ICD-10-CM

## 2023-12-06 DIAGNOSIS — Z12.5 SCREENING PSA (PROSTATE SPECIFIC ANTIGEN): ICD-10-CM

## 2023-12-06 PROCEDURE — 1160F RVW MEDS BY RX/DR IN RCRD: CPT | Performed by: FAMILY MEDICINE

## 2023-12-06 PROCEDURE — 99214 OFFICE O/P EST MOD 30 MIN: CPT | Performed by: FAMILY MEDICINE

## 2023-12-06 PROCEDURE — 1159F MED LIST DOCD IN RCRD: CPT | Performed by: FAMILY MEDICINE

## 2023-12-06 RX ORDER — BUSPIRONE HYDROCHLORIDE 10 MG/1
10 TABLET ORAL 2 TIMES DAILY
Qty: 180 TABLET | Refills: 3 | Status: SHIPPED | OUTPATIENT
Start: 2023-12-06

## 2023-12-06 RX ORDER — DIAZEPAM 5 MG/1
5 TABLET ORAL EVERY 12 HOURS PRN
Qty: 60 TABLET | Refills: 1 | Status: SHIPPED | OUTPATIENT
Start: 2023-12-06

## 2023-12-06 RX ORDER — TAMSULOSIN HYDROCHLORIDE 0.4 MG/1
0.4 CAPSULE ORAL EVERY 24 HOURS
Qty: 90 CAPSULE | Refills: 3 | Status: SHIPPED | OUTPATIENT
Start: 2023-12-06

## 2023-12-06 NOTE — PROGRESS NOTES
Office Note     Name: Omar Waite    : 1945     MRN: [id number]     Chief Complaint  Bronchitis (Follow up/)    Subjective     History of Present Illness:  Omar Waite is a 78 y.o. male who presents today for follow-up anxiety and he needs tamsulosin.    Review of Systems:   Review of Systems    Past Medical History:   Past Medical History:   Diagnosis Date    CAD (coronary artery disease)     Chronic prostatitis     Coronary arteriosclerosis in native artery     GERD with esophagitis     AND HEMORRHAGE    Heart attack     H/O MI    High risk medication use     Hypertension     Insomnia due to anxiety and fear     Malaise and fatigue     Megaloblastic anemia     Mixed hyperlipidemia     Mood insomnia     Phobic disorder     Skin cancer     Vitamin D deficiency        Past Surgical History:   Past Surgical History:   Procedure Laterality Date    CHOLECYSTECTOMY         Family History:   Family History   Problem Relation Age of Onset    Coronary artery disease Mother     Alzheimer's disease Father        Social History:   Social History     Socioeconomic History    Marital status:    Tobacco Use    Smoking status: Former     Packs/day: 3.00     Years: 46.00     Additional pack years: 0.00     Total pack years: 138.00     Types: Cigarettes     Start date:      Quit date:      Years since quittin.9     Passive exposure: Past    Smokeless tobacco: Never   Vaping Use    Vaping Use: Never used   Substance and Sexual Activity    Drug use: Defer    Sexual activity: Defer       Immunizations:   Immunization History   Administered Date(s) Administered    COVID-19 (MODERNA) 1st,2nd,3rd Dose Monovalent 2021, 2021    COVID-19 (MODERNA) Monovalent Original Booster 10/29/2021    COVID-19 F23 (MODERNA) 12YRS+ (SPIKEVAX) 2023    Fluzone High-Dose 65+yrs 10/27/2022, 2023    Hepatitis A 2018, 2019    Influenza, Unspecified 10/29/2020    Pneumococcal  "Conjugate 13-Valent (PCV13) 12/28/2020    Pneumococcal Polysaccharide (PPSV23) 04/09/2015, 11/02/2018    Td (TDVAX) 03/22/2012    Tdap 04/19/2015    Zostavax 11/02/2015    Zoster, Unspecified 01/01/2016        Medications:     Current Outpatient Medications:     ASPIRIN 81 PO, 81 mg., Disp: , Rfl:     atorvastatin (LIPITOR) 80 MG tablet, Take 1 tablet by mouth every night at bedtime., Disp: , Rfl:     docusate sodium (Colace) 100 MG capsule, Take 1 capsule by mouth Daily., Disp: 90 capsule, Rfl: 3    omeprazole (priLOSEC) 40 MG capsule, Take 1 capsule by mouth Daily. before a meal, Disp: 90 capsule, Rfl: 3    traZODone (DESYREL) 50 MG tablet, Take 2 tablets by mouth Every Night., Disp: 180 tablet, Rfl: 3    busPIRone (BUSPAR) 10 MG tablet, Take 1 tablet by mouth 2 (Two) Times a Day., Disp: 180 tablet, Rfl: 3    diazePAM (VALIUM) 5 MG tablet, Take 1 tablet by mouth Every 12 (Twelve) Hours As Needed for Anxiety., Disp: 60 tablet, Rfl: 1    tamsulosin (FLOMAX) 0.4 MG capsule 24 hr capsule, Take 1 capsule by mouth Daily., Disp: 90 capsule, Rfl: 3    Allergies:   No Known Allergies    Objective     Vital Signs  /84   Pulse 60   Ht 180.3 cm (70.98\")   Wt 88.5 kg (195 lb)   SpO2 96%   BMI 27.21 kg/m²   Estimated body mass index is 27.21 kg/m² as calculated from the following:    Height as of this encounter: 180.3 cm (70.98\").    Weight as of this encounter: 88.5 kg (195 lb).            Physical Exam  Vitals and nursing note reviewed.   Constitutional:       Appearance: Normal appearance. He is obese.   HENT:      Head: Normocephalic.      Right Ear: Tympanic membrane and external ear normal.      Left Ear: External ear normal. There is impacted cerumen.      Nose: Nose normal.   Eyes:      Pupils: Pupils are equal, round, and reactive to light.   Musculoskeletal:      Cervical back: Normal range of motion and neck supple.   Skin:     General: Skin is warm and dry.   Neurological:      Mental Status: He is alert. "   Psychiatric:         Mood and Affect: Mood normal.         Behavior: Behavior normal.          Procedures     Assessment and Plan     1. Generalized anxiety disorder  Refill that  - busPIRone (BUSPAR) 10 MG tablet; Take 1 tablet by mouth 2 (Two) Times a Day.  Dispense: 180 tablet; Refill: 3  - diazePAM (VALIUM) 5 MG tablet; Take 1 tablet by mouth Every 12 (Twelve) Hours As Needed for Anxiety.  Dispense: 60 tablet; Refill: 1    2. Impacted cerumen of left ear  Impacted cerumen of the left ear removed that    3. Depression with anxiety      4. Primary insomnia      5. Mixed hyperlipidemia  Atorvastatin 80 mg, cardiology    6. Benign prostatic hyperplasia with lower urinary tract symptoms, symptom details unspecified  I will take over from the urologist       Follow Up  Return in about 4 weeks (around 1/3/2024) for Labs Only.    Omar ADRIAN PC [facility]  [address] [place] [id number] [phone]

## 2024-01-09 ENCOUNTER — LAB (OUTPATIENT)
Dept: FAMILY MEDICINE CLINIC | Facility: CLINIC | Age: 79
End: 2024-01-09
Payer: MEDICARE

## 2024-02-13 ENCOUNTER — OFFICE VISIT (OUTPATIENT)
Dept: FAMILY MEDICINE CLINIC | Facility: CLINIC | Age: 79
End: 2024-02-13
Payer: MEDICARE

## 2024-02-13 VITALS
DIASTOLIC BLOOD PRESSURE: 104 MMHG | OXYGEN SATURATION: 97 % | HEART RATE: 102 BPM | WEIGHT: 197 LBS | HEIGHT: 71 IN | SYSTOLIC BLOOD PRESSURE: 152 MMHG | BODY MASS INDEX: 27.58 KG/M2

## 2024-02-13 DIAGNOSIS — S16.1XXA STRAIN OF NECK MUSCLE, INITIAL ENCOUNTER: Primary | ICD-10-CM

## 2024-02-13 RX ORDER — IBUPROFEN 400 MG/1
400 TABLET ORAL 2 TIMES DAILY
Qty: 14 TABLET | Refills: 0 | Status: SHIPPED | OUTPATIENT
Start: 2024-02-13 | End: 2024-02-20

## 2024-03-08 ENCOUNTER — READMISSION MANAGEMENT (OUTPATIENT)
Dept: CALL CENTER | Facility: HOSPITAL | Age: 79
End: 2024-03-08
Payer: MEDICARE

## 2024-03-08 NOTE — OUTREACH NOTE
Prep Survey      Flowsheet Row Responses   [facility]? [facility]   Is LACE score < 7 ? Non-BH Discharge   Eligibility TCM Hospital [facility]   Date of Discharge 03/08/24   Discharge Disposition Home or Self Care   Discharge diagnosis Non-ST elevation (NSTEMI) myocardial infarction   Does the patient have one of the following disease processes/diagnoses(primary or secondary)? Acute MI (STEMI,NSTEMI)   Prep survey completed? Yes            Deedee SAWANT - Registered Nurse

## 2024-03-11 ENCOUNTER — TRANSITIONAL CARE MANAGEMENT TELEPHONE ENCOUNTER (OUTPATIENT)
Dept: CALL CENTER | Facility: HOSPITAL | Age: 79
End: 2024-03-11
Payer: MEDICARE

## 2024-03-11 NOTE — OUTREACH NOTE
Call Center TCM Note      Flowsheet Row Responses   [facility] patient discharged from? Non-   Does the patient have one of the following disease processes/diagnoses(primary or secondary)? Acute MI (STEMI,NSTEMI)   TCM attempt successful? Yes   Call start time 1327   Call end time 1332   Discharge diagnosis Non-ST elevation (NSTEMI) myocardial infarction   Meds reviewed with patient/caregiver? Yes   Is the patient having any side effects they believe may be caused by any medication additions or changes? No   Does the patient have all medications ordered at discharge? Yes   Is the patient taking all medications as directed (includes completed medication regime)? Yes   Comments Hospital d/c f/u appt on 3/15/24 @1pm   Does the patient have an appointment with their PCP within 7-14 days of discharge? Yes   Has home health visited the patient within 72 hours of discharge? N/A   Psychosocial issues? No   Comments /60's HR in the 70's this am   Did the patient receive a copy of their discharge instructions? Yes   Nursing interventions Reviewed instructions with patient   What is the patient's perception of their health status since discharge? Improving   Is the patient/caregiver able to teach back the hierarchy of who to call/visit for symptoms/problems? PCP, Specialist, Home health nurse, Urgent Care, ED, 911 Yes   TCM call completed? Yes   Call end time 1332   Would this patient benefit from a Referral to Amb Social Work? No   Is the patient interested in additional calls from an ambulatory ? No            Marcia Fofana RN    3/11/2024, 13:32 EDT

## 2024-03-15 ENCOUNTER — OFFICE VISIT (OUTPATIENT)
Dept: FAMILY MEDICINE CLINIC | Facility: CLINIC | Age: 79
End: 2024-03-15
Payer: MEDICARE

## 2024-03-15 VITALS
OXYGEN SATURATION: 95 % | WEIGHT: 194 LBS | SYSTOLIC BLOOD PRESSURE: 100 MMHG | HEART RATE: 68 BPM | BODY MASS INDEX: 27.16 KG/M2 | DIASTOLIC BLOOD PRESSURE: 72 MMHG | HEIGHT: 71 IN

## 2024-03-15 DIAGNOSIS — F41.8 DEPRESSION WITH ANXIETY: ICD-10-CM

## 2024-03-15 DIAGNOSIS — F41.9 ANXIETY: ICD-10-CM

## 2024-03-15 DIAGNOSIS — N40.1 BENIGN PROSTATIC HYPERPLASIA WITH LOWER URINARY TRACT SYMPTOMS, SYMPTOM DETAILS UNSPECIFIED: ICD-10-CM

## 2024-03-15 DIAGNOSIS — F51.01 PRIMARY INSOMNIA: ICD-10-CM

## 2024-03-15 DIAGNOSIS — E78.2 MIXED HYPERLIPIDEMIA: ICD-10-CM

## 2024-03-15 DIAGNOSIS — I10 PRIMARY HYPERTENSION: Primary | ICD-10-CM

## 2024-03-15 RX ORDER — CARVEDILOL 12.5 MG/1
12.5 TABLET ORAL 2 TIMES DAILY WITH MEALS
COMMUNITY

## 2024-03-15 RX ORDER — LISINOPRIL 5 MG/1
5 TABLET ORAL DAILY
COMMUNITY

## 2024-03-15 NOTE — PROGRESS NOTES
Office Note     Name: Omar Waite    : 1945     MRN: [id number]     Chief Complaint  Hospital Follow Up Visit (Arm/shoulder pain  etc)    Subjective     History of Present Illness:  Omar Waite is a 78 y.o. male who presents today for bilateral shoulder pain left and right arm pain.  He presented to the [facility] ER and they kept him a couple of days.  He did the thallium and echo without incident blood pressure was noted to be 217/100 and they got it down to 96/60 outpatient.  Him taking 5 his lisinopril and Coreg 12.5 twice daily.  He is on Flomax which is lowering the orthostasis as well.    Review of Systems:   Review of Systems    Past Medical History:   Past Medical History:   Diagnosis Date    CAD (coronary artery disease)     Chronic prostatitis     Coronary arteriosclerosis in native artery     GERD with esophagitis     AND HEMORRHAGE    Heart attack     H/O MI    High risk medication use     Hypertension     Insomnia due to anxiety and fear     Malaise and fatigue     Megaloblastic anemia     Mixed hyperlipidemia     Mood insomnia     Phobic disorder     Skin cancer     Vitamin D deficiency        Past Surgical History:   Past Surgical History:   Procedure Laterality Date    CHOLECYSTECTOMY         Family History:   Family History   Problem Relation Age of Onset    Coronary artery disease Mother     Alzheimer's disease Father        Social History:   Social History     Socioeconomic History    Marital status:    Tobacco Use    Smoking status: Former     Current packs/day: 0.00     Average packs/day: 3.0 packs/day for 46.0 years (138.0 ttl pk-yrs)     Types: Cigarettes     Start date:      Quit date:      Years since quittin.2     Passive exposure: Past    Smokeless tobacco: Never   Vaping Use    Vaping status: Never Used   Substance and Sexual Activity    Drug use: Defer    Sexual activity: Defer       Immunizations:   Immunization History   Administered  "Date(s) Administered    COVID-19 (MODERNA) 1st,2nd,3rd Dose Monovalent 01/14/2021, 02/23/2021    COVID-19 (MODERNA) Monovalent Original Booster 10/29/2021    COVID-19 F23 (MODERNA) 12YRS+ (SPIKEVAX) 11/27/2023    Fluzone High-Dose 65+yrs 10/27/2022, 11/27/2023    Hepatitis A 11/05/2018, 05/22/2019    Influenza, Unspecified 10/29/2020    Pneumococcal Conjugate 13-Valent (PCV13) 12/28/2020    Pneumococcal Polysaccharide (PPSV23) 04/09/2015, 11/02/2018    Td (TDVAX) 03/22/2012    Tdap 04/19/2015    Zostavax 11/02/2015    Zoster, Unspecified 01/01/2016        Medications:     Current Outpatient Medications:     ASPIRIN 81 PO, 81 mg., Disp: , Rfl:     atorvastatin (LIPITOR) 80 MG tablet, Take 1 tablet by mouth every night at bedtime., Disp: , Rfl:     busPIRone (BUSPAR) 10 MG tablet, Take 1 tablet by mouth 2 (Two) Times a Day., Disp: 180 tablet, Rfl: 3    carvedilol (COREG) 12.5 MG tablet, Take 1 tablet by mouth 2 (Two) Times a Day With Meals., Disp: , Rfl:     diazePAM (VALIUM) 5 MG tablet, Take 1 tablet by mouth Every 12 (Twelve) Hours As Needed for Anxiety., Disp: 60 tablet, Rfl: 1    docusate sodium (Colace) 100 MG capsule, Take 1 capsule by mouth Daily., Disp: 90 capsule, Rfl: 3    lisinopril (PRINIVIL,ZESTRIL) 5 MG tablet, Take 1 tablet by mouth Daily., Disp: , Rfl:     omeprazole (priLOSEC) 40 MG capsule, Take 1 capsule by mouth Daily. before a meal, Disp: 90 capsule, Rfl: 3    tamsulosin (FLOMAX) 0.4 MG capsule 24 hr capsule, Take 1 capsule by mouth Daily., Disp: 90 capsule, Rfl: 3    traZODone (DESYREL) 50 MG tablet, Take 2 tablets by mouth Every Night., Disp: 180 tablet, Rfl: 3    Allergies:   No Known Allergies    Objective     Vital Signs  /72   Pulse 68   Ht 180.3 cm (71\")   Wt 88 kg (194 lb)   SpO2 95%   BMI 27.06 kg/m²   Estimated body mass index is 27.06 kg/m² as calculated from the following:    Height as of this encounter: 180.3 cm (71\").    Weight as of this encounter: 88 kg (194 lb).      "       Physical Exam  Vitals and nursing note reviewed.   Constitutional:       Appearance: Normal appearance. He is normal weight.   HENT:      Head: Normocephalic.      Right Ear: External ear normal.      Left Ear: External ear normal.      Nose: Nose normal.   Eyes:      Pupils: Pupils are equal, round, and reactive to light.   Neck:      Vascular: No carotid bruit.   Cardiovascular:      Rate and Rhythm: Normal rate and regular rhythm.      Pulses: Normal pulses.      Heart sounds: Normal heart sounds.   Pulmonary:      Effort: Pulmonary effort is normal.      Breath sounds: Normal breath sounds.   Musculoskeletal:      Cervical back: Normal range of motion and neck supple.   Skin:     General: Skin is warm and dry.   Neurological:      Mental Status: He is alert.   Psychiatric:         Mood and Affect: Mood normal.          Procedures     Assessment and Plan     1. Primary hypertension  Lower the carvedilol equal 12.5, half tablet a day close 6.25 continue Zestril.    2. Benign prostatic hyperplasia with lower urinary tract symptoms, symptom details unspecified      3. Anxiety      4. Depression with anxiety  Would have lowered the dose anyway    5. Primary insomnia      6. Mixed hyperlipidemia  Keep the follow-up appointment March 26 to the cardiologist.  He is not sore anymore       Follow Up  Return in about 4 weeks (around 4/12/2024).    Omar ADRIAN PC [facility]  [address] [facility] [address] [phone]

## 2024-04-30 ENCOUNTER — OFFICE VISIT (OUTPATIENT)
Dept: FAMILY MEDICINE CLINIC | Facility: CLINIC | Age: 79
End: 2024-04-30
Payer: MEDICARE

## 2024-04-30 VITALS
HEIGHT: 71 IN | OXYGEN SATURATION: 98 % | WEIGHT: 192 LBS | BODY MASS INDEX: 26.88 KG/M2 | DIASTOLIC BLOOD PRESSURE: 72 MMHG | HEART RATE: 75 BPM | SYSTOLIC BLOOD PRESSURE: 120 MMHG

## 2024-04-30 DIAGNOSIS — F41.8 DEPRESSION WITH ANXIETY: ICD-10-CM

## 2024-04-30 DIAGNOSIS — F51.01 PRIMARY INSOMNIA: ICD-10-CM

## 2024-04-30 DIAGNOSIS — Z00.00 ENCOUNTER FOR SUBSEQUENT ANNUAL WELLNESS VISIT (AWV) IN MEDICARE PATIENT: Primary | ICD-10-CM

## 2024-04-30 DIAGNOSIS — I10 PRIMARY HYPERTENSION: ICD-10-CM

## 2024-04-30 DIAGNOSIS — N40.1 BENIGN PROSTATIC HYPERPLASIA WITH LOWER URINARY TRACT SYMPTOMS, SYMPTOM DETAILS UNSPECIFIED: ICD-10-CM

## 2024-04-30 DIAGNOSIS — E78.2 MIXED HYPERLIPIDEMIA: ICD-10-CM

## 2024-04-30 PROCEDURE — 99214 OFFICE O/P EST MOD 30 MIN: CPT | Performed by: FAMILY MEDICINE

## 2024-04-30 PROCEDURE — 1170F FXNL STATUS ASSESSED: CPT | Performed by: FAMILY MEDICINE

## 2024-04-30 PROCEDURE — 3074F SYST BP LT 130 MM HG: CPT | Performed by: FAMILY MEDICINE

## 2024-04-30 PROCEDURE — G0439 PPPS, SUBSEQ VISIT: HCPCS | Performed by: FAMILY MEDICINE

## 2024-04-30 PROCEDURE — 3078F DIAST BP <80 MM HG: CPT | Performed by: FAMILY MEDICINE

## 2024-04-30 RX ORDER — OMEPRAZOLE 40 MG/1
40 CAPSULE, DELAYED RELEASE ORAL DAILY
Qty: 90 CAPSULE | Refills: 3 | Status: SHIPPED | OUTPATIENT
Start: 2024-04-30

## 2024-04-30 RX ORDER — TRAZODONE HYDROCHLORIDE 100 MG/1
100 TABLET ORAL NIGHTLY
Qty: 90 TABLET | Refills: 3 | Status: SHIPPED | OUTPATIENT
Start: 2024-04-30

## 2024-04-30 RX ORDER — CARVEDILOL 6.25 MG/1
6.25 TABLET ORAL 2 TIMES DAILY WITH MEALS
Start: 2024-04-30

## 2024-04-30 NOTE — PROGRESS NOTES
The ABCs of the Annual Wellness Visit  Subsequent Medicare Wellness Visit    Subjective    Omar Waite is a 78 y.o. male who presents for a Subsequent Medicare Wellness Visit.    The following portions of the patient's history were reviewed and   updated as appropriate: allergies, current medications, past family history, past medical history, past social history, past surgical history, and problem list.    Compared to one year ago, the patient feels his physical   health is the same.    Compared to one year ago, the patient feels his mental   health is the same.    Recent Hospitalizations:  He was not admitted to the hospital during the last year.       Current Medical Providers:  Patient Care Team:  Omar Willard MD as PCP - General (Family Medicine)    Outpatient Medications Prior to Visit   Medication Sig Dispense Refill    ASPIRIN 81 PO 81 mg.      atorvastatin (LIPITOR) 80 MG tablet Take 1 tablet by mouth every night at bedtime.      busPIRone (BUSPAR) 10 MG tablet Take 1 tablet by mouth 2 (Two) Times a Day. 180 tablet 3    diazePAM (VALIUM) 5 MG tablet Take 1 tablet by mouth Every 12 (Twelve) Hours As Needed for Anxiety. 60 tablet 1    docusate sodium (Colace) 100 MG capsule Take 1 capsule by mouth Daily. 90 capsule 3    lisinopril (PRINIVIL,ZESTRIL) 5 MG tablet Take 1 tablet by mouth Daily.      tamsulosin (FLOMAX) 0.4 MG capsule 24 hr capsule Take 1 capsule by mouth Daily. 90 capsule 3    carvedilol (COREG) 12.5 MG tablet Take 1 tablet by mouth 2 (Two) Times a Day With Meals.      omeprazole (priLOSEC) 40 MG capsule Take 1 capsule by mouth Daily. before a meal 90 capsule 3    traZODone (DESYREL) 50 MG tablet Take 2 tablets by mouth Every Night. 180 tablet 3     No facility-administered medications prior to visit.       No opioid medication identified on active medication list. I have reviewed chart for other potential  high risk medication/s and harmful drug interactions in the  "elderly.        Aspirin is on active medication list. Aspirin use is indicated based on review of current medical condition/s. Pros and cons of this therapy have been discussed today. Benefits of this medication outweigh potential harm.  Patient has been encouraged to continue taking this medication.  .      Patient Active Problem List   Diagnosis    Anxiety    Encounter for long-term (current) use of other medications    Mixed hyperlipidemia    Benign prostatic hyperplasia with lower urinary tract symptoms    Primary insomnia    Depression with anxiety    Blood pressure elevated without history of HTN    Arteriosclerosis of coronary artery    Old myocardial infarction    Hypertension     Advance Care Planning   Advance Care Planning     Advance Directive is not on file.  ACP discussion was declined by the patient. Patient does not have an advance directive, declines further assistance.     Objective    Vitals:    24 0822   BP: 120/72   Pulse: 75   SpO2: 98%   Weight: 87.1 kg (192 lb)   Height: 180.3 cm (71\")   PainSc: 0-No pain     Estimated body mass index is 26.78 kg/m² as calculated from the following:    Height as of this encounter: 180.3 cm (71\").    Weight as of this encounter: 87.1 kg (192 lb).           Does the patient have evidence of cognitive impairment? No          HEALTH RISK ASSESSMENT    Smoking Status:  Social History     Tobacco Use   Smoking Status Former    Current packs/day: 0.00    Average packs/day: 3.0 packs/day for 46.0 years (138.0 ttl pk-yrs)    Types: Cigarettes    Start date:     Quit date:     Years since quittin.3    Passive exposure: Past   Smokeless Tobacco Never     Alcohol Consumption:  Social History     Substance and Sexual Activity   Alcohol Use None     Fall Risk Screen:    STEADI Fall Risk Assessment was completed, and patient is at LOW risk for falls.Assessment completed on:2024    Depression Screenin/30/2024     8:20 AM   PHQ-2/PHQ-9 " Depression Screening   Little Interest or Pleasure in Doing Things 0-->not at all   Feeling Down, Depressed or Hopeless 0-->not at all   PHQ-9: Brief Depression Severity Measure Score 0       Health Habits and Functional and Cognitive Screenin/30/2024     8:20 AM   Functional & Cognitive Status   Do you have difficulty preparing food and eating? No   Do you have difficulty bathing yourself, getting dressed or grooming yourself? No   Do you have difficulty using the toilet? No   Do you have difficulty moving around from place to place? No   Do you have trouble with steps or getting out of a bed or a chair? No   Current Diet Well Balanced Diet   Dental Exam Other        Dental Exam Comment dentures   Eye Exam Up to date   Exercise (times per week) 1 times per week   Current Exercises Include Yard Work;Walking   Do you need help using the phone?  No   Are you deaf or do you have serious difficulty hearing?  No   Do you need help to go to places out of walking distance? No   Do you need help shopping? No   Do you need help preparing meals?  No   Do you need help with housework?  No   Do you need help with laundry? No   Do you need help taking your medications? No   Do you need help managing money? No   Do you ever drive or ride in a car without wearing a seat belt? No   Have you felt unusual stress, anger or loneliness in the last month? Yes   Who do you live with? Spouse   If you need help, do you have trouble finding someone available to you? No   Have you been bothered in the last four weeks by sexual problems? No   Do you have difficulty concentrating, remembering or making decisions? Yes       Age-appropriate Screening Schedule:  Refer to the list below for future screening recommendations based on patient's age, sex and/or medical conditions. Orders for these recommended tests are listed in the plan section. The patient has been provided with a written plan.    Health Maintenance   Topic Date Due    RSV  "Vaccine - Adults (1 - 1-dose 60+ series) Never done    ZOSTER VACCINE (2 of 3) 02/26/2016    HEPATITIS C SCREENING  Never done    COVID-19 Vaccine (5 - 2023-24 season) 07/20/2024 (Originally 3/27/2024)    INFLUENZA VACCINE  08/01/2024    BMI FOLLOWUP  12/06/2024    LIPID PANEL  03/08/2025    TDAP/TD VACCINES (3 - Td or Tdap) 04/19/2025    ANNUAL WELLNESS VISIT  04/30/2025    Pneumococcal Vaccine 65+  Completed                  CMS Preventative Services Quick Reference  Risk Factors Identified During Encounter  None Identified  The above risks/problems have been discussed with the patient.  Pertinent information has been shared with the patient in the After Visit Summary.  An After Visit Summary and PPPS were made available to the patient.    Follow Up:   Next Medicare Wellness visit to be scheduled in 1 year.       Additional E&M Note during same encounter follows:  Patient has multiple medical problems which are significant and separately identifiable that require additional work above and beyond the Medicare Wellness Visit.      Chief Complaint  Medicare Wellness-subsequent (physical)    Subjective        HPI  Omar Waite is also being seen today for prostate massage, anxiety nervousness, hypertension, BPH, and insomnia.  He wants to change his trazodone instead of 50s to 100 mg nightly he wants to change carvedilol 6.25 mg twice a day with meals         Objective   Vital Signs:  /72   Pulse 75   Ht 180.3 cm (71\")   Wt 87.1 kg (192 lb)   SpO2 98%   BMI 26.78 kg/m²     Physical Exam  Vitals and nursing note reviewed.   Constitutional:       Appearance: Normal appearance. He is obese.   HENT:      Head: Normocephalic.      Right Ear: External ear normal.      Left Ear: External ear normal.      Nose: Nose normal.   Eyes:      Pupils: Pupils are equal, round, and reactive to light.   Genitourinary:     Prostate: Normal.   Musculoskeletal:      Cervical back: Normal range of motion and neck supple. "   Skin:     General: Skin is warm and dry.   Neurological:      Mental Status: He is alert.   Psychiatric:         Mood and Affect: Mood normal.         Behavior: Behavior normal.                         Assessment and Plan   Diagnoses and all orders for this visit:    1. Encounter for subsequent annual wellness visit (AWV) in Medicare patient (Primary)    2. Primary hypertension  Keep the same  3. Mixed hyperlipidemia    4. Benign prostatic hyperplasia with lower urinary tract symptoms, symptom details unspecified  Move the tamsulosin to night time only  5. Depression with anxiety    6. Primary insomnia    Other orders  -     traZODone (DESYREL) 100 MG tablet; Take 1 tablet by mouth Every Night.  Dispense: 90 tablet; Refill: 3  -     carvedilol (Coreg) 6.25 MG tablet; Take 1 tablet by mouth 2 (Two) Times a Day With Meals.  -     omeprazole (priLOSEC) 40 MG capsule; Take 1 capsule by mouth Daily. before a meal  Dispense: 90 capsule; Refill: 3             Follow Up   No follow-ups on file.  Patient was given instructions and counseling regarding his condition or for health maintenance advice. Please see specific information pulled into the AVS if appropriate.

## 2024-05-15 ENCOUNTER — TELEPHONE (OUTPATIENT)
Dept: FAMILY MEDICINE CLINIC | Facility: CLINIC | Age: 79
End: 2024-05-15
Payer: MEDICARE

## 2024-06-16 DIAGNOSIS — F41.1 GENERALIZED ANXIETY DISORDER: ICD-10-CM

## 2024-06-17 RX ORDER — DIAZEPAM 5 MG/1
5 TABLET ORAL EVERY 12 HOURS PRN
Qty: 60 TABLET | Refills: 0 | Status: SHIPPED | OUTPATIENT
Start: 2024-06-17

## 2024-06-28 ENCOUNTER — TELEPHONE (OUTPATIENT)
Dept: FAMILY MEDICINE CLINIC | Facility: CLINIC | Age: 79
End: 2024-06-28

## 2024-06-28 NOTE — TELEPHONE ENCOUNTER
Caller: Omar Waite    Relationship: Self    Best call back number: [phone]     What was the call regarding: WANTS TO KNOW IF HE'S UP TO DATE ON VACCINES, SPECIFIC LY CONCERNED ABOUT SHINGLES

## 2024-06-28 NOTE — TELEPHONE ENCOUNTER
Discussed with pt looked like he was due for a shingles shot but that we will need to ask dr corado about wether pt needs it and other questions in general about shingles. Pt wife is in nursing home with shingles and pt is concerned about catching it.

## 2024-08-06 DIAGNOSIS — F41.1 GENERALIZED ANXIETY DISORDER: ICD-10-CM

## 2024-08-06 RX ORDER — DIAZEPAM 5 MG/1
5 TABLET ORAL EVERY 12 HOURS PRN
Qty: 60 TABLET | Refills: 0 | Status: SHIPPED | OUTPATIENT
Start: 2024-08-06

## 2024-09-30 ENCOUNTER — OFFICE VISIT (OUTPATIENT)
Dept: FAMILY MEDICINE CLINIC | Facility: CLINIC | Age: 79
End: 2024-09-30
Payer: MEDICARE

## 2024-09-30 VITALS
HEART RATE: 55 BPM | WEIGHT: 194 LBS | BODY MASS INDEX: 27.16 KG/M2 | HEIGHT: 71 IN | SYSTOLIC BLOOD PRESSURE: 112 MMHG | DIASTOLIC BLOOD PRESSURE: 68 MMHG | OXYGEN SATURATION: 96 %

## 2024-09-30 DIAGNOSIS — I10 PRIMARY HYPERTENSION: ICD-10-CM

## 2024-09-30 DIAGNOSIS — E78.2 MIXED HYPERLIPIDEMIA: ICD-10-CM

## 2024-09-30 DIAGNOSIS — F51.01 PRIMARY INSOMNIA: ICD-10-CM

## 2024-09-30 DIAGNOSIS — N40.1 BENIGN PROSTATIC HYPERPLASIA WITH LOWER URINARY TRACT SYMPTOMS, SYMPTOM DETAILS UNSPECIFIED: Primary | ICD-10-CM

## 2024-09-30 PROCEDURE — 1126F AMNT PAIN NOTED NONE PRSNT: CPT | Performed by: FAMILY MEDICINE

## 2024-09-30 PROCEDURE — 3074F SYST BP LT 130 MM HG: CPT | Performed by: FAMILY MEDICINE

## 2024-09-30 PROCEDURE — 1159F MED LIST DOCD IN RCRD: CPT | Performed by: FAMILY MEDICINE

## 2024-09-30 PROCEDURE — 3078F DIAST BP <80 MM HG: CPT | Performed by: FAMILY MEDICINE

## 2024-09-30 PROCEDURE — 99214 OFFICE O/P EST MOD 30 MIN: CPT | Performed by: FAMILY MEDICINE

## 2024-09-30 PROCEDURE — 1160F RVW MEDS BY RX/DR IN RCRD: CPT | Performed by: FAMILY MEDICINE

## 2024-09-30 NOTE — PROGRESS NOTES
Office Note     Name: Omar Waite    : 1945     MRN: [id number]     Chief Complaint  Prostate Check (Issues with prostate)    Subjective     History of Present Illness:  Omar Waite is a 79 y.o. male who presents today for ears feeling different, prostatic massage, and shots, and feeling like his pressure getting lower 90s over 60s.    He is doing good because of the Flomax getting up 1 or more twice a night.    Review of Systems:   Review of Systems    Past Medical History:   Past Medical History:   Diagnosis Date    CAD (coronary artery disease)     Chronic prostatitis     Coronary arteriosclerosis in native artery     GERD with esophagitis     AND HEMORRHAGE    Heart attack     H/O MI    High risk medication use     Hypertension     Insomnia due to anxiety and fear     Malaise and fatigue     Megaloblastic anemia     Mixed hyperlipidemia     Mood insomnia     Phobic disorder     Skin cancer     Vitamin D deficiency        Past Surgical History:   Past Surgical History:   Procedure Laterality Date    CHOLECYSTECTOMY         Family History:   Family History   Problem Relation Age of Onset    Coronary artery disease Mother     Alzheimer's disease Father        Social History:   Social History     Socioeconomic History    Marital status:    Tobacco Use    Smoking status: Former     Current packs/day: 0.00     Average packs/day: 3.0 packs/day for 46.0 years (138.0 ttl pk-yrs)     Types: Cigarettes     Start date:      Quit date:      Years since quittin.7     Passive exposure: Past    Smokeless tobacco: Never   Vaping Use    Vaping status: Never Used   Substance and Sexual Activity    Drug use: Defer    Sexual activity: Defer       Immunizations:   Immunization History   Administered Date(s) Administered    COVID-19 (MODERNA) 12YRS+ (SPIKEVAX) 2023    COVID-19 (MODERNA) 1st,2nd,3rd Dose Monovalent 2021, 2021    COVID-19 (MODERNA) Monovalent Original  "Booster 10/29/2021    Fluzone High-Dose 65+yrs 10/27/2022, 11/27/2023    Hepatitis A 11/05/2018, 05/22/2019    Influenza, Unspecified 10/29/2020    Pneumococcal Conjugate 13-Valent (PCV13) 12/28/2020    Pneumococcal Polysaccharide (PPSV23) 04/09/2015, 11/02/2018    Td (TDVAX) 03/22/2012    Tdap 04/19/2015    Zostavax 11/02/2015    Zoster, Unspecified 01/01/2016        Medications:     Current Outpatient Medications:     ASPIRIN 81 PO, 81 mg., Disp: , Rfl:     atorvastatin (LIPITOR) 80 MG tablet, Take 1 tablet by mouth every night at bedtime., Disp: , Rfl:     busPIRone (BUSPAR) 10 MG tablet, Take 1 tablet by mouth 2 (Two) Times a Day., Disp: 180 tablet, Rfl: 3    carvedilol (Coreg) 6.25 MG tablet, Take 1 tablet by mouth 2 (Two) Times a Day With Meals., Disp: , Rfl:     diazePAM (VALIUM) 5 MG tablet, TAKE 1 TABLET BY MOUTH EVERY 12 HOURS AS NEEDED FOR ANXIETY, Disp: 60 tablet, Rfl: 0    docusate sodium (Colace) 100 MG capsule, Take 1 capsule by mouth Daily., Disp: 90 capsule, Rfl: 3    lisinopril (PRINIVIL,ZESTRIL) 5 MG tablet, Take 1 tablet by mouth Daily., Disp: , Rfl:     omeprazole (priLOSEC) 40 MG capsule, Take 1 capsule by mouth Daily. before a meal, Disp: 90 capsule, Rfl: 3    tamsulosin (FLOMAX) 0.4 MG capsule 24 hr capsule, Take 1 capsule by mouth Daily., Disp: 90 capsule, Rfl: 3    traZODone (DESYREL) 100 MG tablet, Take 1 tablet by mouth Every Night., Disp: 90 tablet, Rfl: 3    Allergies:   No Known Allergies    Objective     Vital Signs  /68   Pulse 55   Ht 180.3 cm (71\")   Wt 88 kg (194 lb)   SpO2 96%   BMI 27.06 kg/m²   Estimated body mass index is 27.06 kg/m² as calculated from the following:    Height as of this encounter: 180.3 cm (71\").    Weight as of this encounter: 88 kg (194 lb).            Physical Exam  Vitals and nursing note reviewed.   Constitutional:       Appearance: Normal appearance. He is normal weight.   HENT:      Head: Normocephalic.      Right Ear: External ear normal. "      Left Ear: External ear normal.      Nose: Nose normal.   Eyes:      Pupils: Pupils are equal, round, and reactive to light.   Neck:      Vascular: No carotid bruit.   Cardiovascular:      Rate and Rhythm: Normal rate and regular rhythm.      Pulses: Normal pulses.      Heart sounds: Normal heart sounds.   Pulmonary:      Effort: Pulmonary effort is normal.      Breath sounds: Normal breath sounds.   Genitourinary:     Prostate: Normal.   Musculoskeletal:      Cervical back: Normal range of motion and neck supple.   Skin:     General: Skin is warm and dry.   Neurological:      Mental Status: He is alert.   Psychiatric:         Mood and Affect: Mood normal.          Procedures     Assessment and Plan     1. Benign prostatic hyperplasia with lower urinary tract symptoms, symptom details unspecified  Getting along with the Flomax getting up once or twice a night    2. Primary insomnia  Value helps    3. Primary hypertension  He was suggested to wait with a cardiologist, break in half the carvedilol    4. Mixed hyperlipidemia  Atorvastatin       Follow Up  Return in about 4 months (around 1/30/2025).    @me   [facility]  [address] [place] [id number] [phone]

## 2024-10-23 ENCOUNTER — TELEPHONE (OUTPATIENT)
Dept: FAMILY MEDICINE CLINIC | Facility: CLINIC | Age: 79
End: 2024-10-23
Payer: MEDICARE

## 2024-10-23 NOTE — TELEPHONE ENCOUNTER
PLEASE GIVE PATIENT A CALL. HE STATES HAS HAD 2 SHINGLE SHOTS AND WAS WONDERING IF HE NEEDED A THIRD. [phone]

## 2024-11-06 RX ORDER — TAMSULOSIN HYDROCHLORIDE 0.4 MG/1
1 CAPSULE ORAL DAILY
Qty: 90 CAPSULE | Refills: 0 | OUTPATIENT
Start: 2024-11-06

## 2024-12-03 DIAGNOSIS — F41.1 GENERALIZED ANXIETY DISORDER: ICD-10-CM

## 2024-12-03 RX ORDER — DIAZEPAM 5 MG/1
5 TABLET ORAL EVERY 12 HOURS PRN
Qty: 60 TABLET | Refills: 0 | Status: SHIPPED | OUTPATIENT
Start: 2024-12-03

## 2024-12-17 RX ORDER — TAMSULOSIN HYDROCHLORIDE 0.4 MG/1
1 CAPSULE ORAL DAILY
Qty: 90 CAPSULE | Refills: 0 | Status: SHIPPED | OUTPATIENT
Start: 2024-12-17

## 2024-12-27 ENCOUNTER — TELEPHONE (OUTPATIENT)
Dept: FAMILY MEDICINE CLINIC | Facility: CLINIC | Age: 79
End: 2024-12-27
Payer: MEDICARE

## 2024-12-27 NOTE — TELEPHONE ENCOUNTER
Patient is aware dr. Willard is out of office. After discussing with patient, he decided he will get the shot due to the increased cases of rsv

## 2024-12-27 NOTE — TELEPHONE ENCOUNTER
Caller: Omar Waite    Relationship: Self    Best call back number: [phone]    What is the best time to reach you: ANYTIME    What was the call regarding: PATIENT WOULD LIKE TO KNOW IF HE SHOULD GET THE RSV VACCINATION. PLEASE ADVISE

## 2025-01-09 DIAGNOSIS — E78.2 MIXED HYPERLIPIDEMIA: Primary | ICD-10-CM

## 2025-01-09 DIAGNOSIS — R25.2 CRAMP IN LIMB: ICD-10-CM

## 2025-01-09 DIAGNOSIS — Z12.5 SCREENING PSA (PROSTATE SPECIFIC ANTIGEN): ICD-10-CM

## 2025-01-09 DIAGNOSIS — I10 PRIMARY HYPERTENSION: ICD-10-CM

## 2025-01-09 DIAGNOSIS — E55.9 VITAMIN D DEFICIENCY: ICD-10-CM

## 2025-01-09 DIAGNOSIS — R53.83 OTHER FATIGUE: ICD-10-CM

## 2025-01-09 DIAGNOSIS — D53.1 MEGALOBLASTIC ANEMIA: ICD-10-CM

## 2025-01-09 DIAGNOSIS — Z13.220 SCREENING, LIPID: ICD-10-CM

## 2025-01-10 ENCOUNTER — LAB (OUTPATIENT)
Dept: FAMILY MEDICINE CLINIC | Facility: CLINIC | Age: 80
End: 2025-01-10
Payer: MEDICARE

## 2025-01-10 DIAGNOSIS — I10 PRIMARY HYPERTENSION: ICD-10-CM

## 2025-01-10 DIAGNOSIS — R25.2 CRAMP IN LIMB: ICD-10-CM

## 2025-01-10 DIAGNOSIS — E55.9 VITAMIN D DEFICIENCY: ICD-10-CM

## 2025-01-10 DIAGNOSIS — E78.2 MIXED HYPERLIPIDEMIA: ICD-10-CM

## 2025-01-10 DIAGNOSIS — R53.83 OTHER FATIGUE: ICD-10-CM

## 2025-01-10 DIAGNOSIS — D53.1 MEGALOBLASTIC ANEMIA: ICD-10-CM

## 2025-01-10 DIAGNOSIS — Z12.5 SCREENING PSA (PROSTATE SPECIFIC ANTIGEN): ICD-10-CM

## 2025-01-11 LAB
25(OH)D3+25(OH)D2 SERPL-MCNC: 45.4 NG/ML (ref 30–100)
ALBUMIN SERPL-MCNC: 4.4 G/DL (ref 3.8–4.8)
ALP SERPL-CCNC: 92 IU/L (ref 44–121)
ALT SERPL-CCNC: 26 IU/L (ref 0–44)
AST SERPL-CCNC: 25 IU/L (ref 0–40)
BASOPHILS # BLD AUTO: 0.1 X10E3/UL (ref 0–0.2)
BASOPHILS NFR BLD AUTO: 2 %
BILIRUB SERPL-MCNC: 0.5 MG/DL (ref 0–1.2)
BUN SERPL-MCNC: 10 MG/DL (ref 8–27)
BUN/CREAT SERPL: 9 (ref 10–24)
CALCIUM SERPL-MCNC: 10.1 MG/DL (ref 8.6–10.2)
CHLORIDE SERPL-SCNC: 103 MMOL/L (ref 96–106)
CHOLEST SERPL-MCNC: 102 MG/DL (ref 100–199)
CO2 SERPL-SCNC: 26 MMOL/L (ref 20–29)
CREAT SERPL-MCNC: 1.16 MG/DL (ref 0.76–1.27)
EGFRCR SERPLBLD CKD-EPI 2021: 64 ML/MIN/1.73
EOSINOPHIL # BLD AUTO: 0.2 X10E3/UL (ref 0–0.4)
EOSINOPHIL NFR BLD AUTO: 4 %
ERYTHROCYTE [DISTWIDTH] IN BLOOD BY AUTOMATED COUNT: 12.5 % (ref 11.6–15.4)
FOLATE SERPL-MCNC: >20 NG/ML
GLOBULIN SER CALC-MCNC: 3 G/DL (ref 1.5–4.5)
GLUCOSE SERPL-MCNC: 115 MG/DL (ref 70–99)
HCT VFR BLD AUTO: 45.6 % (ref 37.5–51)
HDLC SERPL-MCNC: 33 MG/DL
HGB BLD-MCNC: 14.8 G/DL (ref 13–17.7)
IMM GRANULOCYTES # BLD AUTO: 0 X10E3/UL (ref 0–0.1)
IMM GRANULOCYTES NFR BLD AUTO: 0 %
LDLC SERPL CALC-MCNC: 47 MG/DL (ref 0–99)
LYMPHOCYTES # BLD AUTO: 1.3 X10E3/UL (ref 0.7–3.1)
LYMPHOCYTES NFR BLD AUTO: 21 %
MAGNESIUM SERPL-MCNC: 2 MG/DL (ref 1.6–2.3)
MCH RBC QN AUTO: 29.1 PG (ref 26.6–33)
MCHC RBC AUTO-ENTMCNC: 32.5 G/DL (ref 31.5–35.7)
MCV RBC AUTO: 90 FL (ref 79–97)
MONOCYTES # BLD AUTO: 0.7 X10E3/UL (ref 0.1–0.9)
MONOCYTES NFR BLD AUTO: 11 %
NEUTROPHILS # BLD AUTO: 3.8 X10E3/UL (ref 1.4–7)
NEUTROPHILS NFR BLD AUTO: 62 %
PLATELET # BLD AUTO: 267 X10E3/UL (ref 150–450)
POTASSIUM SERPL-SCNC: 4.1 MMOL/L (ref 3.5–5.2)
PROT SERPL-MCNC: 7.4 G/DL (ref 6–8.5)
PSA SERPL-MCNC: 1 NG/ML (ref 0–4)
RBC # BLD AUTO: 5.09 X10E6/UL (ref 4.14–5.8)
SODIUM SERPL-SCNC: 140 MMOL/L (ref 134–144)
TRIGL SERPL-MCNC: 121 MG/DL (ref 0–149)
TSH SERPL DL<=0.005 MIU/L-ACNC: 3.25 UIU/ML (ref 0.45–4.5)
VIT B12 SERPL-MCNC: 974 PG/ML (ref 232–1245)
VLDLC SERPL CALC-MCNC: 22 MG/DL (ref 5–40)
WBC # BLD AUTO: 6.2 X10E3/UL (ref 3.4–10.8)

## 2025-01-13 DIAGNOSIS — F41.1 GENERALIZED ANXIETY DISORDER: ICD-10-CM

## 2025-01-14 ENCOUNTER — OFFICE VISIT (OUTPATIENT)
Dept: FAMILY MEDICINE CLINIC | Facility: CLINIC | Age: 80
End: 2025-01-14
Payer: MEDICARE

## 2025-01-14 VITALS
DIASTOLIC BLOOD PRESSURE: 80 MMHG | TEMPERATURE: 97.5 F | HEIGHT: 71 IN | HEART RATE: 74 BPM | BODY MASS INDEX: 27.02 KG/M2 | OXYGEN SATURATION: 98 % | SYSTOLIC BLOOD PRESSURE: 118 MMHG | WEIGHT: 193 LBS

## 2025-01-14 DIAGNOSIS — F41.8 DEPRESSION WITH ANXIETY: ICD-10-CM

## 2025-01-14 DIAGNOSIS — N40.1 BENIGN PROSTATIC HYPERPLASIA WITH LOWER URINARY TRACT SYMPTOMS, SYMPTOM DETAILS UNSPECIFIED: ICD-10-CM

## 2025-01-14 DIAGNOSIS — F51.01 PRIMARY INSOMNIA: ICD-10-CM

## 2025-01-14 DIAGNOSIS — I10 PRIMARY HYPERTENSION: ICD-10-CM

## 2025-01-14 DIAGNOSIS — I25.10 ARTERIOSCLEROSIS OF CORONARY ARTERY: ICD-10-CM

## 2025-01-14 DIAGNOSIS — F41.9 ANXIETY: ICD-10-CM

## 2025-01-14 DIAGNOSIS — E78.2 MIXED HYPERLIPIDEMIA: Primary | ICD-10-CM

## 2025-01-14 PROCEDURE — 1160F RVW MEDS BY RX/DR IN RCRD: CPT | Performed by: FAMILY MEDICINE

## 2025-01-14 PROCEDURE — 1159F MED LIST DOCD IN RCRD: CPT | Performed by: FAMILY MEDICINE

## 2025-01-14 PROCEDURE — 3079F DIAST BP 80-89 MM HG: CPT | Performed by: FAMILY MEDICINE

## 2025-01-14 PROCEDURE — 1126F AMNT PAIN NOTED NONE PRSNT: CPT | Performed by: FAMILY MEDICINE

## 2025-01-14 PROCEDURE — 3074F SYST BP LT 130 MM HG: CPT | Performed by: FAMILY MEDICINE

## 2025-01-14 PROCEDURE — 99213 OFFICE O/P EST LOW 20 MIN: CPT | Performed by: FAMILY MEDICINE

## 2025-01-14 RX ORDER — BUSPIRONE HYDROCHLORIDE 10 MG/1
10 TABLET ORAL 2 TIMES DAILY
Qty: 180 TABLET | Refills: 0 | Status: SHIPPED | OUTPATIENT
Start: 2025-01-14

## 2025-01-14 NOTE — PROGRESS NOTES
Follow Up Office Visit      Date of Visit:  2025   Patient Name: Omar Waite  : 1945   MRN: [id number]     Chief Complaint:    Chief Complaint   Patient presents with    Med Refill       History of Present Illness: Omar Waite is a 79 y.o. male who is here today for follow up.    History of Present Illness  The patient presents for evaluation of htn, depression, prediabetes, and health maintenance.    He underwent blood work on the  and was advised to follow up on the . He has been prescribed carvedilol by his cardiologist, which he continues to take as directed. His cardiac function was reported to be normal during his last visit. He has a history of Premature Ventricular Contractions (PVCs).    He experiences mood fluctuations, particularly during inclement weather. He has been self-administering half a tablet of diazepam during the day and another half at night as needed. He also takes BuSpar and half a tablet of 5 mg diazepam to aid sleep. If he wakes up around 2:00 AM, he takes trazodone, which seems to improve his sleep quality. He is seeking advice on whether this medication regimen is appropriate.    He has a history of elevated glucose levels, with readings of 104, 111, and 115. He consumes gravy for breakfast daily and is questioning if this could be contributing to his elevated glucose levels. He also drinks Diet Coke regularly and has recently increased his ice cream intake. He does not consume sweets before bedtime. He has no family history of diabetes.    He has received the RSV vaccine, influenza vaccine, and two doses of the shingles vaccine. He has not received the COVID-19 vaccine this year.    Supplemental Information  He has been using a topical cream prescribed by his dermatologist for a skin condition. The cream was recommended for a duration of 14 days, but he discontinued its use after 11 days due to an improvement in the condition.    FAMILY  HISTORY  He has no family history of diabetes.    MEDICATIONS  Current: Carvedilol, diazepam, BuSpar, trazodone, Lipitor, amongst others    IMMUNIZATIONS  He has received the RSV vaccine, influenza vaccine, and two doses of the shingles vaccine. He has not received the COVID-19 vaccine this year.      Subjective      Review of Systems:   Review of Systems    Past Medical History:   Past Medical History:   Diagnosis Date    CAD (coronary artery disease)     Chronic prostatitis     Coronary arteriosclerosis in native artery     GERD with esophagitis     AND HEMORRHAGE    Heart attack     H/O MI    High risk medication use     Hypertension     Insomnia due to anxiety and fear     Malaise and fatigue     Megaloblastic anemia     Mixed hyperlipidemia     Mood insomnia     Phobic disorder     Skin cancer     Vitamin D deficiency        Past Surgical History:   Past Surgical History:   Procedure Laterality Date    CHOLECYSTECTOMY         Family History:   Family History   Problem Relation Age of Onset    Coronary artery disease Mother     Alzheimer's disease Father        Social History:   Social History     Socioeconomic History    Marital status:    Tobacco Use    Smoking status: Former     Current packs/day: 0.00     Average packs/day: 3.0 packs/day for 46.0 years (138.0 ttl pk-yrs)     Types: Cigarettes     Start date:      Quit date:      Years since quittin.0     Passive exposure: Past    Smokeless tobacco: Never   Vaping Use    Vaping status: Never Used   Substance and Sexual Activity    Drug use: Defer    Sexual activity: Defer       Medications:     Current Outpatient Medications:     ASPIRIN 81 PO, 81 mg., Disp: , Rfl:     atorvastatin (LIPITOR) 80 MG tablet, Take 1 tablet by mouth every night at bedtime., Disp: , Rfl:     busPIRone (BUSPAR) 10 MG tablet, Take 1 tablet by mouth twice daily, Disp: 180 tablet, Rfl: 0    carvedilol (Coreg) 6.25 MG tablet, Take 1 tablet by mouth 2 (Two) Times a  "Day With Meals., Disp: , Rfl:     diazePAM (VALIUM) 5 MG tablet, TAKE 1 TABLET BY MOUTH EVERY 12 HOURS AS NEEDED FOR ANXIETY, Disp: 60 tablet, Rfl: 0    docusate sodium (Colace) 100 MG capsule, Take 1 capsule by mouth Daily., Disp: 90 capsule, Rfl: 3    lisinopril (PRINIVIL,ZESTRIL) 5 MG tablet, Take 1 tablet by mouth Daily., Disp: , Rfl:     omeprazole (priLOSEC) 40 MG capsule, Take 1 capsule by mouth Daily. before a meal, Disp: 90 capsule, Rfl: 3    tamsulosin (FLOMAX) 0.4 MG capsule 24 hr capsule, Take 1 capsule by mouth once daily, Disp: 90 capsule, Rfl: 0    traZODone (DESYREL) 100 MG tablet, Take 1 tablet by mouth Every Night., Disp: 90 tablet, Rfl: 3    Allergies:   No Known Allergies    Objective     Physical Exam:  Vital Signs:   Vitals:    01/14/25 1002   BP: 118/80   Pulse: 74   Temp: 97.5 °F (36.4 °C)   TempSrc: Oral   SpO2: 98%   Weight: 87.5 kg (193 lb)   Height: 180.3 cm (71\")     Body mass index is 26.92 kg/m².     Physical Exam  Vitals and nursing note reviewed.   Constitutional:       Appearance: Normal appearance. He is obese.   HENT:      Head: Normocephalic.      Right Ear: External ear normal.      Left Ear: External ear normal.      Nose: Nose normal.   Eyes:      Pupils: Pupils are equal, round, and reactive to light.   Musculoskeletal:      Cervical back: Normal range of motion and neck supple.   Skin:     General: Skin is warm and dry.   Neurological:      Mental Status: He is alert.   Psychiatric:         Mood and Affect: Mood normal.         Behavior: Behavior normal.       Physical Exam      Results  Laboratory Studies  PSA 1.0. B12 974. Folate greater than 20. Glucose 115. HDL cholesterol 33. Total cholesterol 102. LDL cholesterol 47. Vitamin D 514. TSH normal. CBC normal.    Procedures      Assessment / Plan      Assessment/Plan:   Diagnoses and all orders for this visit:    1. Mixed hyperlipidemia (Primary)    2. Benign prostatic hyperplasia with lower urinary tract symptoms, symptom " details unspecified    3. Primary insomnia    4. Depression with anxiety    5. Primary hypertension    6. Arteriosclerosis of coronary artery    7. Anxiety       Assessment & Plan  1.htn   l. He should maintain his current medication regimen.    2. Depression.  He was advised to take trazodone at bedtime to help with sleep. If he wakes up during the night, he can take BuSpar and half of a 5 mg diazepam tablet. A prescription for BuSpar was sent today.    3. Prediabetes.  His glucose levels have been recorded as 104, 111, and 115, indicating a prediabetic state. He was advised to cut out sweets before bedtime and be aware of sugar intake. He will return in 4 months for a fasting blood glucose test and A1c.    4. Health Maintenance.  He has received the RSV shot, flu shot, and shingles shot. He was advised to continue with regular health check-ups and vaccinations as needed.    Follow-up  The patient will follow up in 4 months. For A1c     Follow Up:   Return in about 3 months (around 4/14/2025) for get labs .    Patient or patient representative verbalized consent for the use of Ambient Listening during the visit with  Omar Willard MD for chart documentation. 1/14/2025  10:31 EST     @[email or web address] [place]

## 2025-03-13 DIAGNOSIS — F41.1 GENERALIZED ANXIETY DISORDER: ICD-10-CM

## 2025-03-13 RX ORDER — DIAZEPAM 5 MG/1
5 TABLET ORAL EVERY 12 HOURS PRN
Qty: 60 TABLET | Refills: 1 | Status: SHIPPED | OUTPATIENT
Start: 2025-03-13

## 2025-03-13 RX ORDER — TAMSULOSIN HYDROCHLORIDE 0.4 MG/1
1 CAPSULE ORAL DAILY
Qty: 90 CAPSULE | Refills: 0 | Status: SHIPPED | OUTPATIENT
Start: 2025-03-13

## 2025-03-25 ENCOUNTER — OFFICE VISIT (OUTPATIENT)
Dept: FAMILY MEDICINE CLINIC | Facility: CLINIC | Age: 80
End: 2025-03-25
Payer: MEDICARE

## 2025-03-25 VITALS
SYSTOLIC BLOOD PRESSURE: 142 MMHG | WEIGHT: 192 LBS | HEIGHT: 71 IN | DIASTOLIC BLOOD PRESSURE: 80 MMHG | HEART RATE: 76 BPM | BODY MASS INDEX: 26.88 KG/M2 | OXYGEN SATURATION: 99 %

## 2025-03-25 DIAGNOSIS — I10 PRIMARY HYPERTENSION: ICD-10-CM

## 2025-03-25 DIAGNOSIS — F51.01 PRIMARY INSOMNIA: ICD-10-CM

## 2025-03-25 DIAGNOSIS — F41.8 DEPRESSION WITH ANXIETY: ICD-10-CM

## 2025-03-25 DIAGNOSIS — F41.9 ANXIETY: Primary | ICD-10-CM

## 2025-03-25 RX ORDER — MIRTAZAPINE 15 MG/1
7.5 TABLET, FILM COATED ORAL NIGHTLY
Qty: 15 TABLET | Refills: 5 | Status: SHIPPED | OUTPATIENT
Start: 2025-03-25

## 2025-03-25 NOTE — PROGRESS NOTES
"     Follow Up Office Visit      Date of Visit:  2025   Patient Name: Omar Waite  : 1945   MRN: [id number]     Chief Complaint:    Chief Complaint   Patient presents with    prostate issues    Hypertension     Recent elevated bp's       History of Present Illness: Omar Waite is a 79 y.o. male who is here today for follow up.    History of Present Illness  The patient presents for evaluation of insomnia, macular degeneration, and blood pressure management.    He reports persistent fatigue in his eyes, which he attributes to inadequate rest. He experiences awakenings at night, typically around 3:00 AM, and struggles to return to sleep. He also reports experiencing unusual nightmares. He is currently on trazodone and half a tablet of diazepam, which he takes later in the night if he wakes up. He is also on BuSpar for panic attacks.    He has a history of macular degeneration, a condition that also affected his mother, maternal grandmother, and aunt. He experiences floaters in his vision, which are particularly noticeable during nighttime driving. He has been informed that this could be due to allergies. He has had implants.    He expresses concern about his blood pressure, which he believes to be elevated. His blood pressure was 165/95 with a heart rate of 90 when he checked it at home.    Supplemental Information  He has a history of prostate issues, which he believes contribute to his anxiety. He has been advised by his physician in [place] that undergoing the procedure 3 to 4 times annually could potentially eliminate the need for surgery. His last prostate-specific antigen (PSA) level was 1.0. He occasionally experiences urinary frequency at night but does not consider it a significant issue. He is currently on tamsulosin for this condition. He also reports numbness in his feet, particularly under his toes, and describes a sensation of one side feeling \"funny.\"    FAMILY HISTORY  His " mother, maternal grandmother, and aunt had macular degeneration.  His father had prostate issues.    MEDICATIONS  Current: Lexapro, trazodone, diazepam, tamsulosin, BuSpar      Subjective      Review of Systems:   Review of Systems    Past Medical History:   Past Medical History:   Diagnosis Date    CAD (coronary artery disease)     Chronic prostatitis     Coronary arteriosclerosis in native artery     GERD with esophagitis     AND HEMORRHAGE    Heart attack     H/O MI    High risk medication use     Hypertension     Insomnia due to anxiety and fear     Malaise and fatigue     Megaloblastic anemia     Mixed hyperlipidemia     Mood insomnia     Phobic disorder     Skin cancer     Vitamin D deficiency        Past Surgical History:   Past Surgical History:   Procedure Laterality Date    CHOLECYSTECTOMY         Family History:   Family History   Problem Relation Age of Onset    Coronary artery disease Mother     Alzheimer's disease Father        Social History:   Social History     Socioeconomic History    Marital status:    Tobacco Use    Smoking status: Former     Current packs/day: 0.00     Average packs/day: 3.0 packs/day for 46.0 years (138.0 ttl pk-yrs)     Types: Cigarettes     Start date:      Quit date:      Years since quittin.2     Passive exposure: Past    Smokeless tobacco: Never   Vaping Use    Vaping status: Never Used   Substance and Sexual Activity    Alcohol use: Not Currently    Drug use: Never    Sexual activity: Defer       Medications:     Current Outpatient Medications:     ASPIRIN 81 PO, 81 mg., Disp: , Rfl:     atorvastatin (LIPITOR) 80 MG tablet, Take 1 tablet by mouth every night at bedtime., Disp: , Rfl:     busPIRone (BUSPAR) 10 MG tablet, Take 1 tablet by mouth twice daily, Disp: 180 tablet, Rfl: 0    carvedilol (Coreg) 6.25 MG tablet, Take 1 tablet by mouth 2 (Two) Times a Day With Meals., Disp: , Rfl:     diazePAM (VALIUM) 5 MG tablet, TAKE 1 TABLET BY MOUTH EVERY 12  "HOURS AS NEEDED FOR ANXIETY, Disp: 60 tablet, Rfl: 1    docusate sodium (Colace) 100 MG capsule, Take 1 capsule by mouth Daily., Disp: 90 capsule, Rfl: 3    lisinopril (PRINIVIL,ZESTRIL) 5 MG tablet, Take 1 tablet by mouth Daily., Disp: , Rfl:     omeprazole (priLOSEC) 40 MG capsule, Take 1 capsule by mouth Daily. before a meal, Disp: 90 capsule, Rfl: 3    tamsulosin (FLOMAX) 0.4 MG capsule 24 hr capsule, Take 1 capsule by mouth once daily, Disp: 90 capsule, Rfl: 0    mirtazapine (Remeron) 15 MG tablet, Take 0.5 tablets by mouth Every Night., Disp: 15 tablet, Rfl: 5    Allergies:   No Known Allergies    Objective     Physical Exam:  Vital Signs:   Vitals:    03/25/25 1027   BP: 142/80   Pulse: 76   SpO2: 99%   Weight: 87.1 kg (192 lb)   Height: 180.3 cm (71\")     Body mass index is 26.78 kg/m².     Physical Exam  Vitals and nursing note reviewed.   Constitutional:       Appearance: Normal appearance. He is obese.   HENT:      Head: Normocephalic.      Right Ear: External ear normal.      Left Ear: External ear normal.      Nose: Nose normal.   Eyes:      Pupils: Pupils are equal, round, and reactive to light.   Genitourinary:     Prostate: Normal.      Rectum: Normal.   Musculoskeletal:      Cervical back: Normal range of motion and neck supple.   Skin:     General: Skin is warm and dry.   Neurological:      Mental Status: He is alert.   Psychiatric:         Mood and Affect: Mood normal.         Behavior: Behavior normal.       Physical Exam  Vital Signs  Blood pressure is 142/80.    Results  Laboratory Studies  PSA was 1.0 on 01/25/2025.    Procedures      Assessment / Plan      Assessment/Plan:   Diagnoses and all orders for this visit:    1. Anxiety (Primary)    2. Primary insomnia    3. Depression with anxiety    4. Primary hypertension    Other orders  -     mirtazapine (Remeron) 15 MG tablet; Take 0.5 tablets by mouth Every Night.  Dispense: 15 tablet; Refill: 5       Assessment & Plan  1. Insomnia.  He " reports difficulty sleeping, waking up around 3:00 AM, and experiencing strange nightmares. He is currently taking trazodone and diazepam. Trazodone will be discontinued due to its side effects, and Remeron 15 mg will be prescribed to help with sleep. He is advised to take half a tablet of Remeron at night. The potential side effects, including drowsiness, were discussed. The prescription will be sent to [facility] pharmacy.      3. Blood pressure management.  His blood pressure readings have been within the normal range during previous visits (118/80, 112/68, 120/72), but today's reading was 142/80. He is advised to monitor his blood pressure at home and report any significant changes.    Follow Up:   Return in about 4 months (around 7/25/2025).    Patient or patient representative verbalized consent for the use of Ambient Listening during the visit with  Omar Willard MD for chart documentation. 3/25/2025  10:55 EDT     @[facility] [place]

## 2025-04-07 ENCOUNTER — TELEPHONE (OUTPATIENT)
Dept: FAMILY MEDICINE CLINIC | Facility: CLINIC | Age: 80
End: 2025-04-07

## 2025-04-07 RX ORDER — TRAZODONE HYDROCHLORIDE 100 MG/1
100 TABLET ORAL NIGHTLY
Qty: 90 TABLET | Refills: 3 | Status: SHIPPED | OUTPATIENT
Start: 2025-04-07

## 2025-04-07 NOTE — TELEPHONE ENCOUNTER
Caller: Omar Waite    Relationship: Self    Best call back number: [phone]     What medication are you requesting: TRAZODONE 100 MG    What are your current symptoms: SLEEPING DIFFICULTY    How long have you been experiencing symptoms: NIGHTLY    Have you had these symptoms before:    [x] Yes  [] No    Have you been treated for these symptoms before:   [x] Yes  [] No    If a prescription is needed, what is your preferred pharmacy and phone number: [address] [phone] [facility] [phone]  [phone]      Additional notes: PT STATED THAT THE NEW SLEEP MEDICATION WAS MAKING HIM FEEL TERRIBLE IN THE MORNING AND WANTS TO GO BACK TO THE TRAZODONE.

## 2025-04-08 DIAGNOSIS — F41.1 GENERALIZED ANXIETY DISORDER: ICD-10-CM

## 2025-04-08 RX ORDER — BUSPIRONE HYDROCHLORIDE 10 MG/1
10 TABLET ORAL 2 TIMES DAILY
Qty: 180 TABLET | Refills: 0 | Status: SHIPPED | OUTPATIENT
Start: 2025-04-08

## 2025-04-21 ENCOUNTER — TELEPHONE (OUTPATIENT)
Dept: FAMILY MEDICINE CLINIC | Facility: CLINIC | Age: 80
End: 2025-04-21
Payer: MEDICARE

## 2025-04-21 RX ORDER — TRAZODONE HYDROCHLORIDE 100 MG/1
TABLET ORAL
Qty: 180 TABLET | Refills: 1 | Status: SHIPPED | OUTPATIENT
Start: 2025-04-21

## 2025-04-21 NOTE — TELEPHONE ENCOUNTER
Pt states the Trazodone is not helping and would like something different. Called pt and informed him that Dr. Willard is out this week but will send him the message. Pt voiced understanding.

## 2025-04-21 NOTE — TELEPHONE ENCOUNTER
PATIENT HAS CALLED TO LET PCP KNOW THAT SLEEP MEDICATION IS NOT WORKING PATIENT IS REQUESTING IF PCP CAN CALL IN AN ALTERNATIVE SLEEP MEDICATION TO [facility] IN [place]. PATIENT IS REQUESTING A CALL BACK EITHER WAY TO LET HIM KNOW IF ALTERNATIVE MEDICATION WILL BE CALLED IN.    CALL BACK NUMBER [phone]

## 2025-05-20 ENCOUNTER — OFFICE VISIT (OUTPATIENT)
Dept: FAMILY MEDICINE CLINIC | Facility: CLINIC | Age: 80
End: 2025-05-20
Payer: MEDICARE

## 2025-05-20 ENCOUNTER — TELEPHONE (OUTPATIENT)
Dept: FAMILY MEDICINE CLINIC | Facility: CLINIC | Age: 80
End: 2025-05-20

## 2025-05-20 VITALS
HEART RATE: 78 BPM | DIASTOLIC BLOOD PRESSURE: 102 MMHG | OXYGEN SATURATION: 98 % | WEIGHT: 190.5 LBS | SYSTOLIC BLOOD PRESSURE: 202 MMHG | HEIGHT: 71 IN | BODY MASS INDEX: 26.67 KG/M2

## 2025-05-20 DIAGNOSIS — F51.01 PRIMARY INSOMNIA: ICD-10-CM

## 2025-05-20 DIAGNOSIS — E78.2 MIXED HYPERLIPIDEMIA: ICD-10-CM

## 2025-05-20 DIAGNOSIS — R03.0 BLOOD PRESSURE ELEVATED WITHOUT HISTORY OF HTN: ICD-10-CM

## 2025-05-20 DIAGNOSIS — I10 PRIMARY HYPERTENSION: Primary | ICD-10-CM

## 2025-05-20 PROCEDURE — 1159F MED LIST DOCD IN RCRD: CPT | Performed by: FAMILY MEDICINE

## 2025-05-20 PROCEDURE — 1126F AMNT PAIN NOTED NONE PRSNT: CPT | Performed by: FAMILY MEDICINE

## 2025-05-20 PROCEDURE — 3080F DIAST BP >= 90 MM HG: CPT | Performed by: FAMILY MEDICINE

## 2025-05-20 PROCEDURE — 99214 OFFICE O/P EST MOD 30 MIN: CPT | Performed by: FAMILY MEDICINE

## 2025-05-20 PROCEDURE — 1160F RVW MEDS BY RX/DR IN RCRD: CPT | Performed by: FAMILY MEDICINE

## 2025-05-20 PROCEDURE — 3077F SYST BP >= 140 MM HG: CPT | Performed by: FAMILY MEDICINE

## 2025-05-20 RX ORDER — LISINOPRIL 10 MG/1
10 TABLET ORAL DAILY
Qty: 30 TABLET | Refills: 3 | Status: SHIPPED | OUTPATIENT
Start: 2025-05-20

## 2025-05-20 NOTE — PROGRESS NOTES
Follow Up Office Visit      Date of Visit:  2025   Patient Name: Omar Waite  : 1945   MRN: [id number]     Chief Complaint:    Chief Complaint   Patient presents with    Med Refill     Pt is here for a medication recheck today.     Insomnia     C/o insomnia.     Hyperlipidemia    Anxiety       History of Present Illness: Omar Waite is a 79 y.o. male who is here today for follow up.    History of Present Illness  The patient presents for evaluation of elevated blood pressure and sleep issues.    He reports a recent elevation in his blood pressure, which he attributes to anxiety related to an impending storm. His blood pressure has been running high lately, with a recorded measurement of 202/100. He has not been monitoring his blood pressure at home regularly. He has discontinued lisinopril, as advised by his cardiologist, and is currently on a regimen of carvedilol 6.25 mg twice daily, although he did not take his morning dose today. He reports no history of swollen lips or cough associated with lisinopril. He experiences occasional headaches, which are not severe and typically resolve within a few minutes. He also reports experiencing lightheadedness and dizziness.    He continues to struggle with insomnia, despite being on trazodone 100 mg. He has tried other sleep aids in the past but discontinued them due to morning dizziness. He takes half a tablet of diazepam at 11:30 PM, in addition to trazodone, but still wakes up around 2:30 AM and finds it difficult to return to sleep. He estimates his total sleep duration to be between 2 to 3 hours per night.    He is also on tamsulosin.      Subjective      Review of Systems:   Review of Systems    Past Medical History:   Past Medical History:   Diagnosis Date    CAD (coronary artery disease)     Chronic prostatitis     Coronary arteriosclerosis in native artery     GERD with esophagitis     AND HEMORRHAGE    Heart attack     H/O MI     High risk medication use     Hypertension     Insomnia due to anxiety and fear     Malaise and fatigue     Megaloblastic anemia     Mixed hyperlipidemia     Mood insomnia     Phobic disorder     Skin cancer     Vitamin D deficiency        Past Surgical History:   Past Surgical History:   Procedure Laterality Date    CHOLECYSTECTOMY         Family History:   Family History   Problem Relation Age of Onset    Coronary artery disease Mother     Alzheimer's disease Father        Social History:   Social History     Socioeconomic History    Marital status:    Tobacco Use    Smoking status: Former     Current packs/day: 0.00     Average packs/day: 3.0 packs/day for 46.0 years (138.0 ttl pk-yrs)     Types: Cigarettes     Start date:      Quit date:      Years since quittin.3     Passive exposure: Past    Smokeless tobacco: Never   Vaping Use    Vaping status: Never Used   Substance and Sexual Activity    Alcohol use: Not Currently    Drug use: Never    Sexual activity: Defer       Medications:     Current Outpatient Medications:     ASPIRIN 81 PO, 81 mg., Disp: , Rfl:     atorvastatin (LIPITOR) 80 MG tablet, Take 1 tablet by mouth every night at bedtime., Disp: , Rfl:     busPIRone (BUSPAR) 10 MG tablet, Take 1 tablet by mouth twice daily, Disp: 180 tablet, Rfl: 0    carvedilol (Coreg) 6.25 MG tablet, Take 1 tablet by mouth 2 (Two) Times a Day With Meals., Disp: , Rfl:     diazePAM (VALIUM) 5 MG tablet, TAKE 1 TABLET BY MOUTH EVERY 12 HOURS AS NEEDED FOR ANXIETY, Disp: 60 tablet, Rfl: 1    docusate sodium (Colace) 100 MG capsule, Take 1 capsule by mouth Daily., Disp: 90 capsule, Rfl: 3    omeprazole (priLOSEC) 40 MG capsule, Take 1 capsule by mouth Daily. before a meal, Disp: 90 capsule, Rfl: 3    tamsulosin (FLOMAX) 0.4 MG capsule 24 hr capsule, Take 1 capsule by mouth once daily, Disp: 90 capsule, Rfl: 0    traZODone (DESYREL) 100 MG tablet, 1.5 to 2 at night 1 hour and 30 mins before you go to bed,  "Disp: 180 tablet, Rfl: 1    lisinopril (PRINIVIL,ZESTRIL) 10 MG tablet, Take 1 tablet by mouth Daily., Disp: 30 tablet, Rfl: 3    Allergies:   No Known Allergies    Objective     Physical Exam:  Vital Signs:   Vitals:    05/20/25 0954   BP: (!) 202/102   Pulse: 78   SpO2: 98%   Weight: 86.4 kg (190 lb 8 oz)   Height: 180.3 cm (71\")     Body mass index is 26.57 kg/m².     Physical Exam  Vitals and nursing note reviewed.   Constitutional:       Appearance: Normal appearance.   HENT:      Head: Normocephalic.      Right Ear: External ear normal.      Left Ear: External ear normal.      Nose: Nose normal.   Eyes:      Pupils: Pupils are equal, round, and reactive to light.   Musculoskeletal:      Cervical back: Normal range of motion and neck supple.   Skin:     General: Skin is warm and dry.   Neurological:      Mental Status: He is alert.   Psychiatric:         Mood and Affect: Mood normal.         Behavior: Behavior normal.       Physical Exam      Results  Labs   - PSA screen: 01/05/2025, Normal    Procedures      Assessment / Plan      Assessment/Plan:   Diagnoses and all orders for this visit:    1. Primary hypertension (Primary)    2. Primary insomnia    3. Mixed hyperlipidemia    4. Blood pressure elevated without history of HTN    Other orders  -     lisinopril (PRINIVIL,ZESTRIL) 10 MG tablet; Take 1 tablet by mouth Daily.  Dispense: 30 tablet; Refill: 3       Assessment & Plan  1. Elevated blood pressure.  - Blood pressure readings have been consistently high, with a current reading of 202/100 mmHg, which later decreased to 180/110 mmHg.  - Advised to resume carvedilol regimen immediately upon returning home.  - Prescription for lisinopril 10 mg provided, to be taken once daily.  - Encouraged to maintain adequate hydration by consuming water, coffee, or diet coke, and to continue vitamin and medication regimen.    2. Sleep issues.  - Reports difficulty sleeping despite taking trazodone 100 mg and diazepam.  - " Advised to take trazodone at 10:00 PM to help improve sleep quality.  - If symptoms persist, further adjustments to medication regimen will be considered.    Follow-up  - Scheduled for a follow-up visit on Tuesday.    Follow Up:   Return in about 1 week (around 5/27/2025).    Patient or patient representative verbalized consent for the use of Ambient Listening during the visit with  Omar Willard MD for chart documentation. 5/20/2025  10:32 EDT     @[facility] [place]

## 2025-05-20 NOTE — TELEPHONE ENCOUNTER
Patient came back in for bp check since his bp was very elevated this morning at his visit, dr corado wrote for him to start taking prinivil 10mg but after going home and taking his medication and and rechecking his bp he got a lower reading.  I took it and it was 130 /80, so Sudheer told him to hold on taking the new meds and just continue to monitor

## 2025-05-27 ENCOUNTER — OFFICE VISIT (OUTPATIENT)
Dept: FAMILY MEDICINE CLINIC | Facility: CLINIC | Age: 80
End: 2025-05-27
Payer: MEDICARE

## 2025-05-27 VITALS
BODY MASS INDEX: 27.16 KG/M2 | HEART RATE: 61 BPM | DIASTOLIC BLOOD PRESSURE: 82 MMHG | OXYGEN SATURATION: 99 % | SYSTOLIC BLOOD PRESSURE: 140 MMHG | HEIGHT: 71 IN | WEIGHT: 194 LBS

## 2025-05-27 DIAGNOSIS — I10 PRIMARY HYPERTENSION: Primary | ICD-10-CM

## 2025-05-27 DIAGNOSIS — R03.0 BLOOD PRESSURE ELEVATED WITHOUT HISTORY OF HTN: ICD-10-CM

## 2025-05-27 DIAGNOSIS — I25.10 ARTERIOSCLEROSIS OF CORONARY ARTERY: ICD-10-CM

## 2025-05-27 DIAGNOSIS — F51.01 PRIMARY INSOMNIA: ICD-10-CM

## 2025-05-27 DIAGNOSIS — E78.2 MIXED HYPERLIPIDEMIA: ICD-10-CM

## 2025-05-27 PROCEDURE — 3077F SYST BP >= 140 MM HG: CPT | Performed by: FAMILY MEDICINE

## 2025-05-27 PROCEDURE — 99213 OFFICE O/P EST LOW 20 MIN: CPT | Performed by: FAMILY MEDICINE

## 2025-05-27 PROCEDURE — 1126F AMNT PAIN NOTED NONE PRSNT: CPT | Performed by: FAMILY MEDICINE

## 2025-05-27 PROCEDURE — 3079F DIAST BP 80-89 MM HG: CPT | Performed by: FAMILY MEDICINE

## 2025-05-27 PROCEDURE — 1159F MED LIST DOCD IN RCRD: CPT | Performed by: FAMILY MEDICINE

## 2025-05-27 PROCEDURE — 1160F RVW MEDS BY RX/DR IN RCRD: CPT | Performed by: FAMILY MEDICINE

## 2025-05-27 RX ORDER — TAMSULOSIN HYDROCHLORIDE 0.4 MG/1
1 CAPSULE ORAL DAILY
Qty: 90 CAPSULE | Refills: 0 | Status: SHIPPED | OUTPATIENT
Start: 2025-05-27

## 2025-05-27 RX ORDER — OMEPRAZOLE 40 MG/1
40 CAPSULE, DELAYED RELEASE ORAL DAILY
Qty: 90 CAPSULE | Refills: 3 | Status: SHIPPED | OUTPATIENT
Start: 2025-05-27

## 2025-05-27 NOTE — PROGRESS NOTES
Follow Up Office Visit      Date of Visit:  2025   Patient Name: Omar Waite  : 1945   MRN: [id number]     Chief Complaint:    Chief Complaint   Patient presents with    Hypertension     Follow up       History of Present Illness: Omar Waite is a 79 y.o. male who is here today for follow up.    History of Present Illness  The patient presents for evaluation of hypertension.    He reported elevated blood pressure readings last week, which he attributes to not taking his prescribed lisinopril. Blood pressure has typically been well-controlled around 140 systolic, but recently it was recorded at 127/77 with a heart rate of approximately 70. He mentioned not taking carvedilol due to an upcoming blood work appointment. Anxiety was experienced last week due to an impending storm, which may have contributed to elevated blood pressure.      Subjective      Review of Systems:   Review of Systems    Past Medical History:   Past Medical History:   Diagnosis Date    CAD (coronary artery disease)     Chronic prostatitis     Coronary arteriosclerosis in native artery     GERD with esophagitis     AND HEMORRHAGE    Heart attack     H/O MI    High risk medication use     Hypertension     Insomnia due to anxiety and fear     Malaise and fatigue     Megaloblastic anemia     Mixed hyperlipidemia     Mood insomnia     Phobic disorder     Skin cancer     Vitamin D deficiency        Past Surgical History:   Past Surgical History:   Procedure Laterality Date    CHOLECYSTECTOMY         Family History:   Family History   Problem Relation Age of Onset    Coronary artery disease Mother     Alzheimer's disease Father        Social History:   Social History     Socioeconomic History    Marital status:    Tobacco Use    Smoking status: Former     Current packs/day: 0.00     Average packs/day: 3.0 packs/day for 46.0 years (138.0 ttl pk-yrs)     Types: Cigarettes     Start date:      Quit date:       "Years since quittin.4     Passive exposure: Past    Smokeless tobacco: Never   Vaping Use    Vaping status: Never Used   Substance and Sexual Activity    Alcohol use: Not Currently    Drug use: Never    Sexual activity: Defer       Medications:     Current Outpatient Medications:     ASPIRIN 81 PO, 81 mg., Disp: , Rfl:     atorvastatin (LIPITOR) 80 MG tablet, Take 1 tablet by mouth every night at bedtime., Disp: , Rfl:     busPIRone (BUSPAR) 10 MG tablet, Take 1 tablet by mouth twice daily, Disp: 180 tablet, Rfl: 0    carvedilol (Coreg) 6.25 MG tablet, Take 1 tablet by mouth 2 (Two) Times a Day With Meals., Disp: , Rfl:     diazePAM (VALIUM) 5 MG tablet, TAKE 1 TABLET BY MOUTH EVERY 12 HOURS AS NEEDED FOR ANXIETY, Disp: 60 tablet, Rfl: 1    docusate sodium (Colace) 100 MG capsule, Take 1 capsule by mouth Daily., Disp: 90 capsule, Rfl: 3    omeprazole (priLOSEC) 40 MG capsule, Take 1 capsule by mouth Daily. before a meal, Disp: 90 capsule, Rfl: 3    tamsulosin (FLOMAX) 0.4 MG capsule 24 hr capsule, Take 1 capsule by mouth Daily., Disp: 90 capsule, Rfl: 0    traZODone (DESYREL) 100 MG tablet, 1.5 to 2 at night 1 hour and 30 mins before you go to bed, Disp: 180 tablet, Rfl: 1    Allergies:   No Known Allergies    Objective     Physical Exam:  Vital Signs:   Vitals:    25 0822   BP: 140/82   Pulse: 61   SpO2: 99%   Weight: 88 kg (194 lb)   Height: 180.3 cm (71\")     Body mass index is 27.06 kg/m².     Physical Exam  Cardiovascular:      Rate and Rhythm: Normal rate and regular rhythm.      Heart sounds: Normal heart sounds. No murmur heard.      Physical Exam      Results      Procedures      Assessment / Plan      Assessment/Plan:   Diagnoses and all orders for this visit:    1. Primary hypertension (Primary)    2. Arteriosclerosis of coronary artery    3. Blood pressure elevated without history of HTN    4. Mixed hyperlipidemia    5. Primary insomnia    Other orders  -     omeprazole (priLOSEC) 40 MG " capsule; Take 1 capsule by mouth Daily. before a meal  Dispense: 90 capsule; Refill: 3  -     tamsulosin (FLOMAX) 0.4 MG capsule 24 hr capsule; Take 1 capsule by mouth Daily.  Dispense: 90 capsule; Refill: 0       Assessment & Plan  1. Hypertension.  - Blood pressure was high last week, but recent readings show improvement with a measurement of 127/77 mmHg and a heart rate of approximately 70 bpm.  - He has not been taking his prescribed lisinopril.  - Discussion about blood pressure management and medication adherence.  - Advised to resume carvedilol as prescribed.    Follow Up:   Return in about 2 months (around 7/27/2025) for Medicare Wellness.    Patient or patient representative verbalized consent for the use of Ambient Listening during the visit with  Omar Willard MD for chart documentation. 5/27/2025  08:46 EDT     @[facility] [place]

## 2025-07-02 DIAGNOSIS — F41.1 GENERALIZED ANXIETY DISORDER: ICD-10-CM

## 2025-07-02 RX ORDER — BUSPIRONE HYDROCHLORIDE 10 MG/1
10 TABLET ORAL 2 TIMES DAILY
Qty: 180 TABLET | Refills: 0 | Status: SHIPPED | OUTPATIENT
Start: 2025-07-02

## 2025-08-15 RX ORDER — LISINOPRIL 10 MG/1
10 TABLET ORAL DAILY
Qty: 30 TABLET | Refills: 0 | OUTPATIENT
Start: 2025-08-15

## 2025-08-20 RX ORDER — TAMSULOSIN HYDROCHLORIDE 0.4 MG/1
1 CAPSULE ORAL DAILY
Qty: 90 CAPSULE | Refills: 0 | Status: SHIPPED | OUTPATIENT
Start: 2025-08-20